# Patient Record
Sex: FEMALE | Race: WHITE | NOT HISPANIC OR LATINO | Employment: FULL TIME | URBAN - METROPOLITAN AREA
[De-identification: names, ages, dates, MRNs, and addresses within clinical notes are randomized per-mention and may not be internally consistent; named-entity substitution may affect disease eponyms.]

---

## 2018-10-03 ENCOUNTER — OFFICE VISIT CONVERTED (OUTPATIENT)
Dept: PULMONOLOGY | Facility: CLINIC | Age: 53
End: 2018-10-03
Attending: INTERNAL MEDICINE

## 2019-04-03 ENCOUNTER — HOSPITAL ENCOUNTER (OUTPATIENT)
Dept: CARDIOLOGY | Facility: HOSPITAL | Age: 54
Discharge: HOME | End: 2019-04-03
Attending: INTERNAL MEDICINE

## 2019-04-17 ENCOUNTER — OFFICE VISIT CONVERTED (OUTPATIENT)
Dept: PULMONOLOGY | Facility: CLINIC | Age: 54
End: 2019-04-17
Attending: INTERNAL MEDICINE

## 2019-04-22 ENCOUNTER — HOSPITAL ENCOUNTER (OUTPATIENT)
Dept: OTHER | Facility: HOSPITAL | Age: 54
Discharge: HOME | End: 2019-04-22
Attending: INTERNAL MEDICINE

## 2019-07-25 ENCOUNTER — HOSPITAL ENCOUNTER (OUTPATIENT)
Dept: OTHER | Facility: HOSPITAL | Age: 54
Discharge: HOME | End: 2019-07-25
Attending: INTERNAL MEDICINE

## 2019-10-22 ENCOUNTER — OFFICE VISIT CONVERTED (OUTPATIENT)
Dept: PULMONOLOGY | Facility: CLINIC | Age: 54
End: 2019-10-22
Attending: PHYSICIAN ASSISTANT

## 2019-10-30 ENCOUNTER — HOSPITAL ENCOUNTER (OUTPATIENT)
Dept: OTHER | Facility: HOSPITAL | Age: 54
Discharge: HOME | End: 2019-10-30
Attending: PHYSICIAN ASSISTANT

## 2020-03-10 ENCOUNTER — HOSPITAL ENCOUNTER (OUTPATIENT)
Dept: OTHER | Facility: HOSPITAL | Age: 55
Discharge: HOME | End: 2020-03-10
Attending: FAMILY MEDICINE

## 2020-05-28 ENCOUNTER — CONVERSION ENCOUNTER (OUTPATIENT)
Dept: GASTROENTEROLOGY | Facility: CLINIC | Age: 55
End: 2020-05-28
Attending: INTERNAL MEDICINE

## 2020-07-27 ENCOUNTER — HOSPITAL ENCOUNTER (OUTPATIENT)
Dept: PREADMISSION TESTING | Facility: HOSPITAL | Age: 55
Discharge: HOME | End: 2020-07-27
Attending: INTERNAL MEDICINE

## 2020-07-27 LAB — SARS-COV-2 RNA SPEC QL NAA+PROBE: NOT DETECTED

## 2020-07-29 ENCOUNTER — HOSPITAL ENCOUNTER (OUTPATIENT)
Dept: GASTROENTEROLOGY | Facility: HOSPITAL | Age: 55
Setting detail: HOSPITAL OUTPATIENT SURGERY
Discharge: HOME | End: 2020-07-29
Attending: INTERNAL MEDICINE

## 2020-07-29 ENCOUNTER — CONVERSION ENCOUNTER (OUTPATIENT)
Dept: GASTROENTEROLOGY | Facility: HOSPITAL | Age: 55
End: 2020-07-29

## 2021-03-15 ENCOUNTER — HOSPITAL ENCOUNTER (OUTPATIENT)
Dept: OTHER | Facility: HOSPITAL | Age: 56
Discharge: HOME | End: 2021-03-15
Attending: NURSE PRACTITIONER

## 2021-05-10 NOTE — H&P
History and Physical      Patient Name: Anna Solorio   Patient ID: [id number]   Sex: Female   YOB: 1965    Referring Provider: Pretty VASQUEZ    Visit Date: May 28, 2020    Provider: Migue Bishop MD   Location: [facility]   Location Address: [address] [place] [address]  [id number]   Location Phone: [phone]          Chief Complaint  · Surgical History and Physical  · Screening Colonoscopy      History Of Present Illness  NON-INPATIENT HISTORY AND PHYSICAL  Allergies: NO KNOWN DRUG ALLERGIES   Chief Complaint/History of Present Illness: Screening Colonoscopy, History of Colon Polyps-TA   Colon Recall: Yes How Long: 3 years   Failed Outpatient Treatment/Contraindications: N/A   Current Medications: atorvastatin 10 mg oral tablet, buspirone 10 mg oral tablet, Cymbalta 60 mg oral capsule,delayed release(DR/EC), levothyroxine 112 mcg oral tablet, metoprolol succinate 25 mg oral tablet extended release 24 hr, Nexium 40 mg oral capsule,delayed release(DR/EC), Probiotic 3 billion cell oral capsule, Rexulti 0.5 mg oral tablet, Suprep Bowel Prep Kit 17.5-3.13-1.6 gram oral recon soln, and Zyrtec 10 mg oral tablet   Significant Past Medical History: Allergic rhinitis, Anemia, Anxiety, Depression, Diverticulitis, GERD, Mood disorder, and Sleep apnea   Significant Family Medical History: No Family History of Colon Cancer   Significant Past Surgical History: Appendectomy, Breast Surgery, Colonoscopy, EGD, Gallbladder, and Lung Surgery   Previous Colonoscopy: Yes YEAR: 2014 By Whom: Migue Bishop MD   Previous EGD: Yes YEAR: 2014 By Whom: Migue Bishop MD   PHYSICAL EXAM:  Heart: Regular Rate and Rhythm   Lungs: Breathing Unlabored           Assessment  · Preoperative examination     V72.84/Z01.818  · Screening for colon cancer     V76.51/Z12.11  · History of colon polyps     V12.72/Z86.010      Plan  · Orders  o Consent for Colonoscopy Screening -Possible risk/complications, benefits,  and alternatives to surgical or invasive procedure have been explained to patient and/or legal gaurdian. -Patient has been evaluated and can tolerate anethesia and/or sedation. Risk, benefits, and alternatives to anesthesia and sedation have been explained to patient or legal gaurdian. () - V72.84/Z01.818, V76.51/Z12.11, V12.72/Z86.010 - 07/29/2020  · Medications  o Medications have been Reconciled  o Transition of Care or Provider Policy  · Instructions  o ****Surgical Orders****  o ***************  o Outpatient  o ***************  o RISK AND BENEFITS:  o Possible risks/complications, benefits and alternatives to surgical or invasive procedure have been explained to the patient and/or legal guardian.  o Patient has been evaluated and can tolerate anesthesia and/or sedation. Risks, benefits, and alternatives to anesthesia and sedation have been explained to the patient and/or legal guardian.  o ***************  o PREP: Per protocol  o IV: Per Anesthesia  o The above History and Physical Examination has been completed within 30 days of admission.  o This note has been transcribed by CAMELIA Matt MA. I have read and agree with the findings in this note.  o Electronically Identified Patient Education Materials Provided Electronically  · Disposition  o Call or Return if symptoms worsen or persist.            Electronically Signed by: Kwadwo Matt, -Author on May 28, 2020 08:48:35 AM

## 2021-05-28 VITALS
DIASTOLIC BLOOD PRESSURE: 88 MMHG | WEIGHT: 185.25 LBS | RESPIRATION RATE: 14 BRPM | BODY MASS INDEX: 34.09 KG/M2 | BODY MASS INDEX: 36.84 KG/M2 | OXYGEN SATURATION: 99 % | HEIGHT: 62 IN | OXYGEN SATURATION: 97 % | HEART RATE: 124 BPM | TEMPERATURE: 98.1 F | TEMPERATURE: 97.5 F | DIASTOLIC BLOOD PRESSURE: 90 MMHG | HEIGHT: 61 IN | SYSTOLIC BLOOD PRESSURE: 150 MMHG | SYSTOLIC BLOOD PRESSURE: 140 MMHG | RESPIRATION RATE: 16 BRPM | WEIGHT: 195.12 LBS | HEART RATE: 109 BPM

## 2021-05-28 VITALS
HEART RATE: 103 BPM | HEIGHT: 61 IN | TEMPERATURE: 98.3 F | WEIGHT: 182.12 LBS | SYSTOLIC BLOOD PRESSURE: 137 MMHG | BODY MASS INDEX: 34.39 KG/M2 | DIASTOLIC BLOOD PRESSURE: 99 MMHG | RESPIRATION RATE: 12 BRPM | OXYGEN SATURATION: 94 %

## 2021-05-28 NOTE — PROGRESS NOTES
Patient: TAMAR WALLIS     Acct: [id number]     Report: #[id number]  UNIT #: [id number]     : 1965    Encounter Date:10/22/2019  PRIMARY CARE: LENNY CARREON  ***Signed***  --------------------------------------------------------------------------------------------------------------------  Chief Complaint      Encounter Date      Oct 22, 2019            Primary Care Provider      LENNY CARREON            Referring Provider      LENNY CARREON            Patient Complaint      Patient is complaining of      Pt here for left lower lobe pneumonia/COPD            VITALS      Height 5 ft 1 in / 154.94 cm      Weight 182 lbs 2 oz / 82.222378 kg      BSA 1.82 m2      BMI 34.4 kg/m2      Temperature 98.3 F / 36.83 C - Oral      Pulse 103      Respirations 12      Blood Pressure 137/99 Sitting, Right Arm      Pulse Oximetry 94%, room air      Initial Exhaled Nitrous Oxide      Exhaled Nitrous Oxide Results:  8            HPI      The patient is a pleasant 54 year old white female patient of Dr. Lopes's last     seen by her in the office in 2019.  She has had a history of mild COPD,     emphysema, history of benign lung mass that was resected in 2015.  She is here     today because she recently had a pneumonia that was treated by her PCP.  She     tells me that she had a chest x-ray at her PCPs office in [place] done about    one week ago that showed a left lower lobe pneumonia.  She was treated with ten     days of Augmentin. She still has four days of this left.  She was treated with a    tapered course of prednisone and has one dose of that left. She is improving,     had been having some increased dyspnea, coughing and wheezing and feels like     that is almost fully resolved now. She now feels like she is about back to her     baseline.  She denies any hemoptysis, fever or chills. She is using Trelegy     ellipta one puff once a day and feels like it helps her. She has been using     DuoNebs twice a day for the  past one week while she was being treated for     pneumonia and is asking if she can back off on those now that she is feeling     better.            I have reviewed her ROS, medical, surgical and family history and agree with     those as entered in the chart.      Copies To:   SADIE REDD ;            FERNANDO      Constitutional:  Denies: Fatigue, Fever, Weight gain, Weight loss, Chills,     Insomnia, Other      Respiratory/Breathing:  Complains of: Shortness of air, Wheezing, Cough; Denies:    Hemoptysis, Pleuritic pain, Other      Endocrine:  Denies: Polydipsia, Polyuria, Heat/cold intolerance, Abnorml     menstrual pattern, Diabetes, Other      Eyes:  Denies: Blurred vision, Vision Changes, Other      Ears, nose, mouth, throat:  Complains of: Nasal Discharge, Throat pain; Denies:     Congestion, Dysphagia, Hearing Changes, Nose Bleeding, Tinnitus, Other      Cardiovascular:  Denies: Chest Pain, Exertional dyspnea, Peripheral Edema,     Palpitations, Syncope, Wake up Gasping for air, Orthopnea, Tachycardia, Other      Gastrointestinal:  Complains of: Diarrhea (pt thinks due to antibiotic she is     taking); Denies: Abdominal pain/cramping, Bloody stools, Constipation, Melena,     Nausea, Vomiting, Other      Genitourinary:  Complains of: Urinary frequency; Denies: Dysuria, Incontinence,     Hematuria, Urgency, Other      Musculoskeletal:  Complains of: Joint Pain, Joint Stiffness, Joint Swelling;     Denies: Myalgias, Other      Hematologic/lymphatic:  COMPLAINS OF: Bruising; DENIES: Lymphadenopathy,     Bleeding tendencies, Other      Neurologic:  Denies: Headache, Numbness, Weakness, Seizures, Other      Psychiatric:  Complains of: Anxiety (due to steroids), Depression; Denies:     Appropriate Effect, Other      Sleep:  No: Excessive daytime sleep, Morning Headache?, Snoring, Insomnia?, Stop    breathing at sleep?, Other      Integumentary:  Complains of: Dry skin; Denies: Rash, Skin Warm to Touch, Other             FAMILY/SOCIAL/MEDICAL HX      Surgical History:  Yes: Cholecystectomy (), Other Surgeries ( resection     of left upper lobe lung mass); No: Abdominal Surgery, Appendectomy, Bladder     Surgery, Bowel Surgery, CABG, Head Surgery, Oral Surgery, Orthopedic Surgery,     Vascular Surgery      Diabetes - Family Hx:  Sister      Cancer/Type - Family Hx:  Mother (leukemia, polycythemia), Father (leukemia     related to agent orange)      Other Family Medical History:  Mother (emphysema/copd)      Is Father Still Living?:  No      Is Mother Still Living?:  No      Social History:  Tobacco Use; No Alcohol Use, No Recreational Drug use      Smoking status:  Former smoker (2 ppd x 20 year, quit 2015)      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      Whom do you live with?:  3 dogs, 1 cat       Section:  Yes      Tubal Ligation:  No      Hysterectomy:  No      Anticoagulation Therapy:  No      Antibiotic Prophylaxis:  No      Medical History:  Yes: Asthma, Chemotherapy/Cancer (LEFT LUNG MASS -benign),     Chronic Bronchitis/COPD, Depression, Anxiety, Shortness Of Breath (LUNG MASS     LEFT LUNG, ), Thyroid Problem; No: Arthritis, Blood Disease, Congestive Heart     Failu, Deafness or Ringing Ears, Diabetes, Seizures, Heart Attack,     Hemorrhoids/Rectal Prob, High Blood Pressure, Sinus Trouble, Miscellaneous     Medical/oth      Psychiatric History      Depression/Anxiety            PREVENTION      Hx Influenza Vaccination:  No      Date Influenza Vaccine Given:  Sep 25, 2019      Influenza Vaccine Declined:  No      2 or More Falls Past Year?:  No      Fall Past Year with Injury?:  No      Hx Pneumococcal Vaccination:  Yes      Encouraged to follow-up with:  PCP regarding preventative exams.      Chart initiated by      Katie Ayoub MA            ALLERGIES/MEDICATIONS      Allergies:        Coded Allergies:             NO KNOWN DRUG ALLERGIES (Verified  Allergy, Unknown, 10/22/19)      Medications     Last Reconciled on 10/22/19 15:38 by SURESH BLANK      Albuterol Sulfate (Albuterol Sulfate) 1.25 Mg/3 Ml Vial.neb      1.25 MG INH Q4H PRN for SHORTNESS OF BREATH, #120 NEB 0 Refills         Reported         10/22/19       Amoxicillin/Clavulanic Acid 875/125 (Augmentin 875/125) 1 Each Tablet      875 MG PO BID, TAB 0 Refills         Reported         10/22/19       Metoprolol Succinate (Metoprolol Succinate) 25 Mg Tab.er.24h      25 MG PO QDAY, #30 TAB 0 Refills         Reported         10/22/19       Atorvastatin (Atorvastatin) 10 Mg Tablet      10 MG PO HS, #30 TAB         Reported         10/22/19       Levocetirizine Dihydrochloride (Xyzal) 5 Mg Tablet      5 MG PO QDAY, TAB         Reported         10/22/19       Levothyroxine (Levothyroxine) 0.112 Mg Tablet      0.112 MG PO QDAY@07, #30 TAB 0 Refills         Reported         10/22/19       Fluticasone/Umeclidin/Vilanter (Trelegy Ellipta 100-62.5-25) 1 Each Blst.w.dev      1 PUFF INH RTQDAY, #1 INH 3 Refills         Prov: SADIE REDD         8/26/19       MDI-Albuterol (Ventolin HFA) 8 Gm Hfa.aer.ad      2 PUFFS INH Q4-6H PRN for SHORTNESS OF BREATH, #1 INH 6 Refills         Reported         10/3/18       DULoxetine (Cymbalta) 30 Mg Capsule.dr      90 MG PO QDAY, #90 CAP 0 Refills         Reported         12/14/17       Esomeprazole Mag (NexIUM) 40 Mg Capsule      40 MG PO HS, CAP         Reported         2/7/13      Current Medications      Current Medications Reviewed 10/22/19            EXAM      VITAL SIGNS:  Reviewed.        GENERAL:       NECK:  Supple without tracheal deviation or lymphadenopathy.  No thyromegaly     appreciated.      LYMPHATICS:  No cervical or supraclavicular lymphadenopathy.      HEENT: Pupils are equal, round and reactive to light. There is no scleral     icterus.  Nares patent without hypertrophy of the turbinates. No erythema of the    passages.  TMs are clear bilaterally with good cone of light. The posterior     pharynx  is without  lesions or erythema.      RESPIRATORY:  Lungs are grossly clear to auscultation.  No wheezes, rhonchi or     crackles appreciated.  Normal work of breathing.        CARDIOVASCULAR:  Regular rate and rhythm.  No murmurs, gallops or rubs.  No     lower extremity edema.  Equal radial pulses.        GI: Soft, nontender, nondistended, no organomegaly.  Bowel sounds present in all    four quadrants.      MUSCULOSKELETAL:  No joint effusions, erythema or warmth over the major joint     systems.      SKIN:  No rashes or lesions.      NEUROLOGIC: Cranial nerves II-XII are intact bilaterally.  Moves all     extremities. Ambulates with ease.      PSYCH:  Appropriate mood and affect.      Vitals      Vitals:             Height 5 ft 1 in / 154.94 cm           Weight 182 lbs 2 oz / 82.529882 kg           BSA 1.82 m2           BMI 34.4 kg/m2           Temperature 98.3 F / 36.83 C - Oral           Pulse 103           Respirations 12           Blood Pressure 137/99 Sitting, Right Arm           Pulse Oximetry 94%, room air            REVIEW      Results Reviewed      PCCS Results Reviewed?:  Yes Prev Lab Results, Yes Prev Radiology Results, Yes     Previous [facility] Records      Lab Results      I personally reviewed previous lab work, imaging and provider notes.            Assessment      CAP (community acquired pneumonia) - J18.9            Notes      New Medications      * Levothyroxine 0.112 MG TABLET: 0.112 MG PO QDAY@07 #30         Replaced Levothyroxine (Synthroid) 100 MCG TABLET:         0.1 MG PO QDAY@07 #30      * Levocetirizine Dihydrochloride (Xyzal) 5 MG TABLET: 5 MG PO QDAY      * Atorvastatin 10 MG TABLET: 10 MG PO HS #30      * Metoprolol Succinate 25 MG TAB.ER.24H: 25 MG PO QDAY #30      * Amoxicillin/Clavulanic Acid 875/125 (Augmentin 875/125) 1 EACH TABLET: 875 MG       PO BID      * Albuterol Sulfate 1.25 MG/3 ML VIAL.NEB: 1.25 MG INH Q4H PRN SHORTNESS OF       BREATH #120         Instructions: DIAGNOSIS  CODE REQUIRED PRIOR TO PRESCRIBING.      New Diagnostics      * Chest 2 View, Week         Dx: CAP (community acquired pneumonia) - J18.9      ASSESSMENT:       1.  Mild COPD without acute exacerbation.      2.  Recent community acquired pneumonia from an unspecified organism.      3.  Emphysema.      4.  History of benign lung mass, status post resection.      5. Former heavy tobacco use now in remission since 2015.        6.  Anemia.      6. Right upper lobe ground glass opacity seen on chest CT in 07/2019, already     scheduled for six month follow up CT.              PLAN:      1.  I have discussed with patient regarding recent pneumonia. I do not have any     records or imaging reports from her PCP, but I will request a report from chest     x-ray that was done one week ago. She has four days left of a ten day course of     Augmentin, is finishing up a tapered course of prednisone and is clinically     improving.  I would have her complete that course of antibiotics and steroids     and will repeat a chest x-ray for her in one week to ensure resolution of recent    pneumonia. I did also discuss with her regarding chest CT results from 07/2019.     She had been called with those results as well as previously seen left lower     lobe nodule had resolved, but there was some new right upper lobe ground glass     opacities. She is already scheduled for six month follow up chest CT and will     have that done in about three months.      2. I will have her continue on Trelegy ellipta inhaler with one puff once daily     and albuterol as needed. I have discussed with her that I am fine with her using    DuoNebs just as needed for shortness of breath, coughing or wheezing rather than    as scheduled as it seems that her recent pneumonia is resolving.      3. I have congratulated her on remaining off of cigarettes.  She quit smoking in    2015.      4. I will have her follow up in 3-4 months with Dr. Lopes after chest  CT is     done and she is to call us sooner if needed.            Patient Education      Patient Education Provided:  COPD, How to use an Inhaler      Time Spent:  > 50% /Coord Care            Electronically signed by ALEK ABARCA PA-C  10/24/2019 16:08       Disclaimer: Converted document may not contain table formatting or lab diagrams. Please see [employer] for the authenticated document.

## 2021-05-28 NOTE — PROGRESS NOTES
Patient: TAMAR WALLIS     Acct: [id number]     Report: #[id number]  UNIT #: [id number]     : 1965    Encounter Date:10/03/2018  PRIMARY CARE: LENNY CARREON  ***Signed***  --------------------------------------------------------------------------------------------------------------------  Chief Complaint      Encounter Date      Oct 3, 2018            Primary Care Provider      LENNY CARREON            Referring Provider      LENNY CARREON            Patient Complaint      Patient is complaining of      New pt here for COPD            VITALS      Height 5 ft 2 in / 157.48 cm      Weight 185 lbs 4 oz / 84.125921 kg      BSA 1.85 m2      BMI 33.9 kg/m2      Temperature 98.1 F / 36.72 C - Temporal      Pulse 124      Respirations 16      Blood Pressure 150/88 Sitting, Left Arm      Pulse Oximetry 99%, Room air      Exhaled Nitrous Oxide Testin            HPI      The patient is a 53 year old former tobacco user with a 40 pack year smoking     history.  She quit in  and presents for establishing care for COPD.  She was    a patient of Dr. Christensen's until she decided to seek a new provider. She has     been treated for pneumonia with two rounds of antibiotics within the last month,    although she says her chest x-ray was clear. She has a history of a left lung     benign mass that was resected. She has failed Symbicort before and is currently     on Trelegy ellipta inhaler and thinks that it works quite well.  She does not     have to use her rescue inhaler often.  She denies shortness of breath, coughing     and wheezing.              Review of systems as noted.            Past medical, family, social and surgical history updated in the chart,     unchanged since last visit.              Medications were reviewed and updated in the chart.            ROS      Constitutional:  Complains of: Other (allergies); Denies: Fatigue, Fever, Weight    gain, Weight loss, Chills, Insomnia      Respiratory/Breathing:   Denies: Shortness of air, Wheezing, Cough, Hemoptysis,     Pleuritic pain, Other      Endocrine:  Denies: Polydipsia, Polyuria, Heat/cold intolerance, Abnorml     menstrual pattern, Diabetes, Other      Eyes:  Denies: Blurred vision, Vision Changes, Other      Ears, nose, mouth, throat:  Denies: Congestion, Dysphagia, Hearing Changes, Nose    Bleeding, Nasal Discharge, Throat pain, Tinnitus, Other      Cardiovascular:  Denies: Chest Pain, Exertional dyspnea, Peripheral Edema,     Palpitations, Syncope, Wake up Gasping for air, Orthopnea, Tachycardia, Other      Gastrointestinal:  Denies: Abdominal pain/cramping, Bloody stools, Constipation,    Diarrhea, Melena, Nausea, Vomiting, Other      Genitourinary:  Denies: Dysuria, Urinary frequency, Incontinence, Hematuria,     Urgency, Other      Musculoskeletal:  Denies: Joint Pain, Joint Stiffness, Joint Swelling, Myalgias,    Other      Hematologic/lymphatic:  DENIES: Lymphadenopathy, Bruising, Bleeding tendencies,     Other      Neurologic:  Denies: Headache, Numbness, Weakness, Seizures, Other      Psychiatric:  Denies: Anxiety, Appropriate Effect, Depression, Other      Sleep:  No: Excessive daytime sleep, Morning Headache?, Snoring, Insomnia?, Stop    breathing at sleep?, Other      Integumentary:  Denies: Rash, Dry skin, Skin Warm to Touch, Other            FAMILY/SOCIAL/MEDICAL HX      Surgical History:  Yes: Cholecystectomy (2012), Other Surgeries (2015 resection     of left upper lobe lung mass); No: Abdominal Surgery, Appendectomy, Bladder     Surgery, Bowel Surgery, CABG, Head Surgery, Oral Surgery, Orthopedic Surgery,     Vascular Surgery      Diabetes - Family Hx:  Sister      Cancer/Type - Family Hx:  Mother (leukemia, polycythemia), Father (leukemia     related to agent orange)      Other Family Medical History:  Mother (emphysema/copd)      Is Father Still Living?:  No      Is Mother Still Living?:  No      Smoking status:  Former smoker (2 ppd x 20 year,  quit 6/2015)      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      Whom do you live with?:  3 dogs, 1 cat      Anticoagulation Therapy:  No      Antibiotic Prophylaxis:  No      Medical History:  Yes: Asthma, Chemotherapy/Cancer (LEFT LUNG MASS -benign),     Chronic Bronchitis/COPD, Depression, Shortness Of Breath (LUNG MASS LEFT LUNG,     ), Thyroid Problem; No: Arthritis, Blood Disease, Congestive Heart Failu,     Deafness or Ringing Ears, Diabetes, Seizures, Heart Attack, Hemorrhoids/Rectal     Prob, High Blood Pressure, Miscellaneous Medical/oth      Psychiatric History      Depression            PREVENTION      Hx Influenza Vaccination:  Yes      Date Influenza Vaccine Given:  Oct 1, 2017      Influenza Vaccine Declined:  No      2 or More Falls Past Year?:  No      Fall Past Year with Injury?:  No      Hx Pneumococcal Vaccination:  Yes      Encouraged to follow-up with:  PCP regarding preventative exams.      Chart initiated by      Agatha Wilson MA            ALLERGIES/MEDICATIONS      Allergies:        Coded Allergies:             NO KNOWN DRUG ALLERGIES (Unverified  Allergy, Unknown, 10/3/18)      Medications    Last Reconciled on 10/3/18 15:16 by SADIE REDD,       Brompheneiramine/PE/DM 1-2.5-5 MG/5ML (Dimaphen DM) 118 Ml Liquid      10 ML PO Q4H PRN for COUGH, ML         Reported         10/3/18       Iron,Carbonyl/Ascorbic Acid (Iron 100-Vitamin C) 1 Each Tablet      1 EACH PO, TAB         Reported         10/3/18       Potassium Gluconate (Potassium Gluconate) 99 Mg Tab      99 MG PO Q2D, #30 TAB         Reported         10/3/18       Cholecalciferol (Vitamin D3) 50,000 Unit Capsule      92726 UNITS PO Q7D, CAP         Reported         10/3/18       Pyridoxine Hcl (Vitamin B-6*) 50 Mg Tablet      100 MG PO QDAY, TAB         Reported         10/3/18       Fluticasone/Umeclidin/Vilanter (Trelegy Ellipta 100-62.5-25) 1 Each Blst.w.dev      1 PUFF INH RTQDAY, #1 INH         Reported         10/3/18        Fexofenadine HCl (Allegra Allergy) 180 Mg Tablet      180 MG PO QDAY, #30 TAB 0 Refills         Reported         10/3/18       Levothyroxine (Synthroid) 100 Mcg Tablet      0.1 MG PO QDAY@07, #30 TAB 0 Refills         Reported         12/14/17       DULoxetine (Cymbalta) 30 Mg Capsule.dr      90 MG PO QDAY, #90 CAP 0 Refills         Reported         12/14/17       diphenhydrAMINE HCl (Benadryl*) 25 Mg Tablet      50 MG PO HS, #100 TAB         Reported         12/11/14       Esomeprazole Mag (NexIUM) 40 Mg Capsule      40 MG PO HS, CAP         Reported         2/7/13      Current Medications      Current Medications Reviewed 10/3/18            EXAM      VITAL SIGNS:  Reviewed.  She was tachycardic at 124 beats per minute.        NECK:  Supple without tracheal deviation or lymphadenopathy.  No thyromegaly     appreciated.      LYMPHATICS:  No cervical or supraclavicular lymphadenopathy.      HEENT:  The patient had a facial droop on the right.        RESPIRATORY:  Clear to auscultation bilaterally.  No wheezes, rales or rhonchi.     Tympanic to percussion.        CARDIOVASCULAR:  Tachycardia, but regular rhythm without murmurs, rubs or     gallops.        GI: Soft, nontender, nondistended, no organomegaly.  Bowel sounds present in all    four quadrants.      MUSCULOSKELETAL:  No joint effusions, erythema or warmth over the major joint     systems.      SKIN:  No rashes or lesions.      NEUROLOGIC: Cranial nerves II-XII are intact bilaterally.  Moves all     extremities. Ambulates with ease.      PSYCH:  Appropriate mood and affect.      Vitals      Vitals:             Height 5 ft 2 in / 157.48 cm           Weight 185 lbs 4 oz / 84.610598 kg           BSA 1.85 m2           BMI 33.9 kg/m2           Temperature 98.1 F / 36.72 C - Temporal           Pulse 124           Respirations 16           Blood Pressure 150/88 Sitting, Left Arm           Pulse Oximetry 99%, Room air            REVIEW      Results Reviewed       [facility]  Yes Prev Lab Results, Yes Prev Radiology Results, Yes     Previous Mecial Records      Radiographic Results               [facility]                PACS RADIOLOGY REPORT            Patient: TAMAR WALLIS   Acct: #[id number]   Report: #[id number]            UNIT #: [id number]    DOS: 18 1341      INSURANCE:[facility]   ORDER #:[id number]      LOCATION:[facility]     : 1965            PROVIDERS      ADMITTING:     ATTENDING: Wendie Christensen      FAMILY:  NONE,MD   ORDERING:  Wendie Christensen         OTHER:    DICTATING:  Rangel Browning MD            REQ #:[id number]   EXAM:W - CT CHEST with CONTRAST      REASON FOR EXAM:        REASON FOR VISIT:  LUNG NODULE/COPD            *******Signed******         PROCEDURE:   CT CHEST W/ CONTRAST             COMPARISON:   [facility], PET, SKULL BASE TO MID THIGH INITIAL,     2018, 8:52.        [facility], CT, CHEST W/ CONTRAST, 2017, 20:34.             INDICATIONS:   COPD; LUNG NODULE             TECHNIQUE:   After obtaining the patient's consent, CT images were obtained with    non-ionic       intravenous contrast material.               PROTOCOL:     Standard imaging protocol performed                RADIATION:     DLP: 605mGy*cm          Automated exposure control was utilized to minimize radiation dose.       CONTRAST:   95cc Isovue 370 I.V.             FINDINGS:         There is upper lobe emphysema.  There is scarring and what appears to be perhaps    some surgical       sutures involving the lateral aspect of the left upper lobe.  On the previous     study from 2017       there was noted a large area of consolidation pleural-based laterally in the lef    t upper lobe which       has completely resolved on today's study.  There is mild scarring in the     lingular segment of the       left upper lobe.  There are no  suspicious pulmonary parenchymal mass is.  There     are several small       left hilar and mediastinal lymph nodes which are unchanged in size.  There is     again noted a large       8.6 cm hiatal hernia.  There is fatty infiltration of the liver.  Surgical clips    suggest prior       cholecystectomy.  Again is noted a small 1.6 cm right adrenal adenoma.             CONCLUSION:                1.  Scarring and postoperative changes in the left upper lobe.  This is     superimposed on       predominantly upper lobe emphysema.  Specifically given the patient's history,     there are no       suspicious pulmonary nodules.             2.  Minimal left hilar and mediastinal adenopathy which is stable.             3.  Fatty infiltration of the liver.              MIKI VILLARREAL MD             Electronically Signed and Approved By: MIKI VILLARREAL MD on 4/24/2018 at 14:56                           Until signed, this is an unconfirmed preliminary report that may contain      errors and is subject to change.                                              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            Tachycardia - R00.0            Notes      New Medications      * Fexofenadine HCl (Allegra Allergy) 180 MG TABLET: 180 MG PO QDAY #30      * Fluticasone/Umeclidin/Vilanter (Trelegy Ellipta 100-62.5-25) 1 EACH       BLST.W.DEV: 1 PUFF INH RTQDAY #1      * Pyridoxine Hcl (Vitamin B-6*) 50 MG TABLET: 100 MG PO QDAY      * Cholecalciferol (Vitamin D3) 50,000 UNIT CAPSULE: 50,000 UNITS PO Q7D      * Potassium Gluconate 99 MG TAB: 99 MG PO Q2D #30      * IRON,CARBONYL/ASCORBIC ACID (Iron 100-Vitamin C) 1 EACH TABLET: 1 EACH PO      * Brompheneiramine/PE/DM 1-2.5-5 MG/5ML (Dimaphen DM) 118 ML LIQUID: 10 ML PO       Q4H PRN COUGH      * Fluticasone/Umeclidin/Vilanter (Trelegy Ellipta 100-62.5-25) 1 EACH        BLST.W.DEV: 1 PUFF INH RTQDAY #1      * MDI-Albuterol (Ventolin HFA*) 8 GM HFA.AER.AD: 2 PUFFS INH Q4-6H PRN SHORTNESS      OF BREATH #1      Changed Medications      * diphenhydrAMINE HCl (Benadryl*) 25 MG TABLET: 50 MG PO HS #100         Instructions: 1 TAB      Discontinued Medications      * Ondansetron HCl (Zofran*) 4 MG TABLET: 4 MG PO Q6H PRN NAUSEA AND/OR VOMITING       #4      * Guaifenesin (Humibid La*) 600 MG TAB.ER.12H: 1,200 MG PO BID #20      * Levofloxacin (Levaquin*) 750 MG TABLET: 750 MG PO QDAY 10 Days #10      New Diagnostics      * Low Dose Chest CT, 6 Months         Dx: Former smoker - Z87.891      * 6 Min Walk With Pulse Ox, Routine         Dx: 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  COPD, unspecified severity.      2.  Emphysema.      3.  Former smoker.      4.  History of recurrent pneumonia.      5.  History of benign lung mass, status post resection.      6. Tachycardia.            PLAN:      1.  Obtain EKG.      2.  Obtain full PFTs and six minute walk test.      3.  She will be due for a low dose CT scan of the chest in 04/2019. This is six     months from today and I have went ahead and ordered that.      4. Continue Trelegy, I gave her more samples today.      5. I commended her on her smoking cessation.      6. She will get her flu shot from her PCP.  She thinks she has had her pneumonia    vaccine.            Patient Education      Patient Education Provided:  COPD      Time Spent:  > 50% /[facility]                 Disclaimer: Converted document may not contain table formatting or lab diagrams. Please see [employer] for the authenticated document.

## 2021-05-28 NOTE — PROGRESS NOTES
Patient: TAMAR WALLIS     Acct: [id number]     Report: #[id number]  UNIT #: [id number]     : 1965    Encounter Date:2019  PRIMARY CARE: LENNY CARREON  ***Signed***  --------------------------------------------------------------------------------------------------------------------  Chief Complaint      Encounter Date      2019            Primary Care Provider      LENNY CARREON            Referring Provider      LENNY CARREON            Patient Complaint      Patient is complaining of      Pt here for 6mo F/U and PFT results            VITALS      Height 5 ft 1 in / 154.94 cm      Weight 195 lbs 2 oz / 88.034308 kg      BSA 1.87 m2      BMI 36.9 kg/m2      Temperature 97.5 F / 36.39 C - Temporal      Pulse 109      Respirations 14      Blood Pressure 140/90 Sitting, Right Arm      Pulse Oximetry 97%, room air      Initial Exhaled Nitrous Oxide      Exhaled Nitrous Oxide Results:  8            HPI      The patient is a 53 year old former tobacco user with a history of COPD and     emphysema.  She is here today for follow up regarding abnormal chest CT.  I had     ordered a repeat CT scan prior to today's visit, but unfortunately it was     rejected because she was not in the lung cancer screening program.  She returned    today stating that she is more wheezy secondary to allergies.  She is taking a     Claritin in the morning, Singulair at night and on the weekends she takes     Benadryl in the evenings.  She is on Trelegy ellipta and takes this everyday and    thinks it is working fairly well except for recently when the blooming season     happened.  She has an albuterol rescue inhaler and uses it approximately once     per day.  She has sleep apnea that is untreated. She does not wear a CPAP     machine secondary to not being able to tolerate the masks.  She has an     appointment with gastroenterology for workup of anemia and is due to undergo an     EGD and colonoscope. This patient has a  history of abnormal PET scan in 01/2018     which showed a focal hypermetabolic activity in the distal esophagus which was     suppose to be correlated with upper endoscopy, but she has not had this done     yet.              Review of systems as noted in the chart.              Past medical, family, social and surgical history reviewed and I agree with that    as documented in the medical chart.  She has a history of benign left lung mass,    status post resection.              Medications were reviewed and updated in the chart.            ROS      Constitutional:  Denies: Fatigue, Fever, Weight gain, Weight loss, Chills,     Insomnia, Other      Respiratory/Breathing:  Complains of: Shortness of air, Wheezing, Cough; Denies:    Hemoptysis, Pleuritic pain, Other      Endocrine:  Denies: Polydipsia, Polyuria, Heat/cold intolerance, Abnorml     menstrual pattern, Diabetes, Other      Eyes:  Denies: Blurred vision, Vision Changes, Other      Ears, nose, mouth, throat:  Denies: Mouth lesions, Thrush, Throat pain,     Hoarseness, Allergies/Hay Fever, Post Nasal Drip, Headaches, Recent Head Injury,    Nose Bleeding, Neck Stiffness, Thyroid Mass, Hearing Loss, Ear Fullness, Dry     Mouth, Nasal or Sinus Pain, Dry Lips, Nasal discharge, Nasal congestion, Other      Cardiovascular:  Denies: Palpitations, Syncope, Claudication, Chest Pain, Wake     up Gasping for air, Leg Swelling, Irregular Heart Rate, Cyanosis, Dyspnea on     Exertion, Other      Gastrointestinal:  Complains of: Reflux/Heartburn; Denies: Nausea, Constipation,    Diarrhea, Abdominal pain, Vomiting, Difficulty Swallowing, Dysphagia, Jaundice,     Bloating, Melena, Bloody stools, Other      Genitourinary:  Denies: Urinary frequency, Incontinence, Hematuria, Urgency,     Nocturia, Dysuria, Testicular problems, Other      Musculoskeletal:  Denies: Joint Pain, Joint Stiffness, Joint Swelling, Myalgias,    Other      Hematologic/lymphatic:  DENIES:  Lymphadenopathy, Bruising, Bleeding tendencies,     Other      Neurological:  Denies: Headache, Numbness, Weakness, Seizures, Other      Psychiatric:  Denies: Anxiety, Appropriate Effect, Depression, Other      Sleep:  Yes: Excessive daytime sleep; No: Morning Headache?, Snoring, Insomnia?,    Stop breathing at sleep?, Other      Integumentary:  Denies: Rash, Dry skin, Skin Warm to Touch, Other      Immunologic/Allergic:  Denies: Latex allergy, Seasonal allergies, Asthma,     Urticaria, Eczema, Other      Immunization status:  No: Up to date            FAMILY/SOCIAL/MEDICAL HX      Surgical History:  Yes: Cholecystectomy (2012), Other Surgeries (2015 resection     of left upper lobe lung mass); No: Abdominal Surgery, Appendectomy, Bladder     Surgery, Bowel Surgery, CABG, Head Surgery, Oral Surgery, Orthopedic Surgery,     Vascular Surgery      Diabetes - Family Hx:  Sister      Cancer/Type - Family Hx:  Mother (leukemia, polycythemia), Father (leukemia     related to agent orange)      Other Family Medical History:  Mother (emphysema/copd)      Is Father Still Living?:  No      Is Mother Still Living?:  No      Smoking status:  Former smoker (2 ppd x 20 year, quit 6/2015)      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      Whom do you live with?:  3 dogs, 1 cat      Anticoagulation Therapy:  No      Antibiotic Prophylaxis:  No      Medical History:  Yes: Asthma, Chemotherapy/Cancer (LEFT LUNG MASS -benign),     Chronic Bronchitis/COPD, Depression, Shortness Of Breath (LUNG MASS LEFT LUNG,     ), Thyroid Problem; No: Arthritis, Blood Disease, Congestive Heart Failu,     Deafness or Ringing Ears, Diabetes, Seizures, Heart Attack, Hemorrhoids/Rectal     Prob, High Blood Pressure, Miscellaneous Medical/oth      Psychiatric History      Depression            PREVENTION      Hx Influenza Vaccination:  Yes      Date Influenza Vaccine Given:  Oct 1, 2017      Influenza Vaccine Declined:  No      2 or More Falls Past  Year?:  No      Fall Past Year with Injury?:  No      Hx Pneumococcal Vaccination:  Yes      Encouraged to follow-up with:  PCP regarding preventative exams.      Chart initiated by      Aliya Masters MA            ALLERGIES/MEDICATIONS      Allergies:        Coded Allergies:             NO KNOWN DRUG ALLERGIES (Verified  Allergy, Unknown, 4/17/19)      Medications    Last Reconciled on 4/17/19 15:13 by SADIE REDD, DO      Magnesium Amino Acid Chelate (Magnesium*) 100 Mg Tablet      250 MG PO QDAY, TAB         Reported         4/17/19       Ascorbic Acid (Vitamin C*) 500 Mg Tablet      500 MG PO QDAY, #60 TAB 0 Refills         Reported         4/17/19       Montelukast Sodium (Montelukast*) 10 Mg Tablet      10 MG PO QDAY, TAB         Reported         4/17/19       Loratadine (Claritin) 10 Mg Tablet      10 MG PO QDAY, #30 TAB 0 Refills         Reported         4/17/19       MDI-Albuterol (Ventolin HFA) 8 Gm Hfa.aer.ad      2 PUFFS INH Q4-6H PRN for SHORTNESS OF BREATH, #1 INH 6 Refills         Reported         10/3/18       Fluticasone/Umeclidin/Vilanter (Trelegy Ellipta 100-62.5-25) 1 Each Blst.w.dev      1 PUFF INH RTQDAY, #1 INH 3 Refills         Prov: SADIE REDD         10/3/18       Iron,Carbonyl/Ascorbic Acid (Iron 100-Vitamin C) 1 Each Tablet      1 EACH PO, TAB         Reported         10/3/18       Fluticasone/Umeclidin/Vilanter (Trelegy Ellipta 100-62.5-25) 1 Each Blst.w.dev      1 PUFF INH RTQDAY, #1 INH         Reported         10/3/18       Levothyroxine (Synthroid) 100 Mcg Tablet      0.1 MG PO QDAY@07, #30 TAB 0 Refills         Reported         12/14/17       DULoxetine (Cymbalta) 30 Mg Capsule.dr      90 MG PO QDAY, #90 CAP 0 Refills         Reported         12/14/17       Esomeprazole Mag (NexIUM) 40 Mg Capsule      40 MG PO HS, CAP         Reported         2/7/13      Current Medications      Current Medications Reviewed 4/17/19            EXAM      VITAL SIGNS:  Reviewed.        NECK:   Supple without tracheal deviation or lymphadenopathy.  No thyromegaly     appreciated.      LYMPHATICS:  No cervical or supraclavicular lymphadenopathy.      HEENT: Pupils are equal, round and reactive to light. There is no scleral     icterus.  Nares patent without hypertrophy of the turbinates. No erythema of the    passages.  TMs are clear bilaterally with good cone of light. The posterior     pharynx is without  lesions or erythema.      RESPIRATORY:  Clear to auscultation bilaterally.  No wheezes, rales or rhonchi.     Tympanic to percussion.        CARDIOVASCULAR:  Regular rate and rhythm.  No murmurs, gallops or rubs.  No low    er extremity edema.  Equal radial pulses.        GI: Soft, nontender, nondistended, no organomegaly.  Bowel sounds present in all    four quadrants.      MUSCULOSKELETAL:  No joint effusions, erythema or warmth over the major joint     systems.      SKIN:  No rashes or lesions.      NEUROLOGIC: Cranial nerves II-XII are intact bilaterally.  Moves all     extremities. Ambulates with ease.      PSYCH:  Appropriate mood and affect.      Vtials      Vitals:             Height 5 ft 1 in / 154.94 cm           Weight 195 lbs 2 oz / 88.920008 kg           BSA 1.87 m2           BMI 36.9 kg/m2           Temperature 97.5 F / 36.39 C - Temporal           Pulse 109           Respirations 14           Blood Pressure 140/90 Sitting, Right Arm           Pulse Oximetry 97%, room air            REVIEW      Results Reviewed      PCCS Results Reviewed?:  Yes Prev Lab Results, Yes Prev Radiology Results, Yes     [facility]      Lab Results      Reviewed pulmonary function test and six minute walk test.      Radiographic Results               [facility]                PACS RADIOLOGY REPORT            Patient: TAMAR WALLIS   Acct: #[id number]   Report: #[id number]            UNIT #: [id number]    DOS: 01/08/18 0822      INSURANCE:[employer] - [employer]   ORDER #:[id number]      LOCATION:PET     : 1965            PROVIDERS      ADMITTING:     ATTENDING: Wendie Christensen      FAMILY:  JACKI CAMPBELL   ORDERING:  Wendie Christensen         OTHER:    DICTATING:  Bennie Dong MD, IV            REQ #:[id number]   EXAM:[id number] - SKULL BASE TO MIDTHIGH INITIAL      REASON FOR EXAM:  R91.1      REASON FOR VISIT:  R91.1            *******Signed******         PROCEDURE:   PET CT SKULL BASE TO MID THIGH INITIAL             COMPARISON:   [facility], CT, CHEST W/ CONTRAST, 2017,     20:34.             INDICATIONS:   R91.1             TECHNIQUE:   After obtaining the patient's consent, F-18 FDG was administered     intravenously.  A PET       scan with concurrent CT imaging was performed from the skull to the thigh with     multiplanar imaging.             RADIONUCLIDE:     11.63 MCI   F18 FDG- I.V.      LABS:                          Blood Glucose 105 mg/dl             FINDINGS:         HEAD/NECK:   Normal.  No pathologic FDG activity.        THORAX:   There is a 3 cm irregular area of consolidation in the left upper lobe    with minimal       increased metabolic activity. No evidence of hypermetabolic adenopathy or     pleural effusion. There       is diffuse tree in bud infiltrate in the left lung. Moderate emphysema is     present.      ABDOMEN:   A large hiatal hernia is present. There is focal hypermetabolic     activity in the distal       esophagus. There is a 1.2 cm right adrenal adenoma. No evidence of abnormal     hypermetabolic activity       in the solid abdominal organs. The patient is status post cholecystectomy.      PELVIS:   Normal.  No pathologic FDG activity.        BONES:   Degenerative changes are present throughout the spine..  No pathologic     FDG activity.        OTHER:   Negative.               CONCLUSION:                1. 3 cm irregular area of consolidation in the left upper lobe with minimal      increased metabolic       activity. Diffuse tree in bud infiltrate throughout the left lung field. An     infectious/inflammatory       processes favored over malignancy.             2. No evidence of hypermetabolic adenopathy or pleural effusion.             3. 1.2 cm right adrenal adenoma.             4. Large hiatal hernia. Focal hypermetabolic activity in the distal esophagus.     Suggest correlation       with upper endoscopy to evaluate for esophagitis or mucosal mass.              ELIAS DONOHUE MD             Electronically Signed and Approved By: ELIAS DONOHUE MD on 2018 at 9:48                           Until signed, this is an unconfirmed preliminary report that may contain      errors and is subject to change.                                              BRYJE:      D:18 0948               [facility]                PACS RADIOLOGY REPORT            Patient: TAMAR WALLIS   Acct: #[id number]   Report: #[id number]            UNIT #: [id number]    DOS: 18 1341      INSURANCE:[facility]   ORDER #:[id number]      LOCATION:[facility]     : 1965            PROVIDERS      ADMITTING:     ATTENDING: Wendie Christensen      FAMILY:  NONE,MD   ORDERING:  Wendie Christensen         OTHER:    DICTATING:  Rangel Browning MD            REQ #:[id number]   EXAM:CHW - CT CHEST with CONTRAST      REASON FOR EXAM:        REASON FOR VISIT:  LUNG NODULE/COPD            *******Signed******         PROCEDURE:   CT CHEST W/ CONTRAST             COMPARISON:   [facility], PET, SKULL BASE TO MID THIGH INITIAL,     2018, 8:52.        [facility], CT, CHEST W/ CONTRAST, 2017, 20:34.             INDICATIONS:   COPD; LUNG NODULE             TECHNIQUE:   After obtaining the patient's consent, CT images were obtained with    non-ionic       intravenous contrast material.               PROTOCOL:     Standard  imaging protocol performed                RADIATION:     DLP: 605mGy*cm          Automated exposure control was utilized to minimize radiation dose.       CONTRAST:   95cc Isovue 370 I.V.             FINDINGS:         There is upper lobe emphysema.  There is scarring and what appears to be perhaps    some surgical       sutures involving the lateral aspect of the left upper lobe.  On the previous     study from 12/14/2017       there was noted a large area of consolidation pleural-based laterally in the     left upper lobe which       has completely resolved on today's study.  There is mild scarring in the     lingular segment of the       left upper lobe.  There are no suspicious pulmonary parenchymal mass is.  There     are several small       left hilar and mediastinal lymph nodes which are unchanged in size.  There is     again noted a large       8.6 cm hiatal hernia.  There is fatty infiltration of the liver.  Surgical clips    suggest prior       cholecystectomy.  Again is noted a small 1.6 cm right adrenal adenoma.             CONCLUSION:                1.  Scarring and postoperative changes in the left upper lobe.  This is     superimposed on       predominantly upper lobe emphysema.  Specifically given the patient's history,     there are no       suspicious pulmonary nodules.             2.  Minimal left hilar and mediastinal adenopathy which is stable.             3.  Fatty infiltration of the liver.              MIKI VILLARREAL MD             Electronically Signed and Approved By: MIKI VILLARREAL MD on 4/24/2018 at 14:56                           Until signed, this is an unconfirmed preliminary report that may contain      errors and is subject to change.                                              SCHJ1:      D:04/24/18 1456            Assessment      Notes      New Medications      * Loratadine (Claritin) 10 MG TABLET: 10 MG PO QDAY #30      * MONTELUKAST SODIUM (Montelukast*) 10 MG TABLET: 10 MG PO  QDAY      * ASCORBIC ACID (Vitamin C*) 500 MG TABLET: 500 MG PO QDAY #60      * Magnesium Amino Acid Chelate (Magnesium*) 100 MG TABLET: 250 MG PO QDAY      New Diagnostics      * Chest W/O Cont CT, SCHEDULED PROCEDURE         Dx: COPD (chronic obstructive pulmonary disease) - J44.9      ASSESSMENT:       1.  Mild COPD.      2.  Emphysema.      3.  History of benign lung mass, status post resection.      4. Former heavy tobacco user in remission.      5.  Anemia, not otherwise specified.      6. Abnormal focal hypermetabolic activity in the distal esophagus.              PLAN:      1.  I have encouraged the patient to follow up with her GI specialist.  She is     scheduled to see them in early June.  She would benefit from EGD and colonoscopy    given her history.      2.  I reviewed pulmonary function test and six minute walk test with her today.     I congratulated her on her on her ongoing smoking cessation.      3. I offered referral to immunologist allergist, but she deferred.      4.  Continue Trelegy ellipta and albuterol as needed.      5. I have reordered CT scan of the chest as this is overdue.  She was suppose to    have one one year from prior, but it was not scheduled, so I have ordered it     today.            Patient Education      ACO BMI High above 25:  Encouraged weight loss      Patient Education Provided:  COPD, How to use an Inhaler, Lung Cancer      Time Spent:  > 50% /[facility]                 Disclaimer: Converted document may not contain table formatting or lab diagrams. Please see [employer] for the authenticated document.

## 2021-09-03 RX ORDER — BREXPIPRAZOLE 0.5 MG/1
TABLET ORAL
COMMUNITY

## 2021-09-03 RX ORDER — CETIRIZINE HYDROCHLORIDE 10 MG/1
TABLET ORAL
COMMUNITY

## 2021-09-03 RX ORDER — DULOXETIN HYDROCHLORIDE 60 MG/1
CAPSULE, DELAYED RELEASE ORAL
COMMUNITY

## 2021-09-03 RX ORDER — DIPHENHYDRAMINE HCL 25 MG
CAPSULE ORAL
COMMUNITY

## 2021-09-03 RX ORDER — METOPROLOL SUCCINATE 25 MG/1
TABLET, EXTENDED RELEASE ORAL
COMMUNITY

## 2021-09-03 RX ORDER — LEVOTHYROXINE SODIUM 112 UG/1
TABLET ORAL
COMMUNITY

## 2021-09-03 RX ORDER — BUSPIRONE HYDROCHLORIDE 10 MG/1
TABLET ORAL
COMMUNITY

## 2021-09-03 RX ORDER — ATORVASTATIN CALCIUM 10 MG/1
TABLET, FILM COATED ORAL
COMMUNITY

## 2021-09-03 RX ORDER — ESOMEPRAZOLE MAGNESIUM 40 MG/1
CAPSULE, DELAYED RELEASE ORAL
COMMUNITY

## 2021-09-07 ENCOUNTER — OFFICE VISIT (OUTPATIENT)
Dept: SURGERY | Facility: CLINIC | Age: 56
End: 2021-09-07

## 2021-09-07 VITALS — WEIGHT: 187.4 LBS | HEIGHT: 61 IN | BODY MASS INDEX: 35.38 KG/M2

## 2021-09-07 DIAGNOSIS — R22.31 ARM MASS, RIGHT: Primary | ICD-10-CM

## 2021-09-07 PROBLEM — R22.30 ARM MASS: Status: ACTIVE | Noted: 2021-09-07

## 2021-09-07 PROCEDURE — 99203 OFFICE O/P NEW LOW 30 MIN: CPT | Performed by: SURGERY

## 2021-09-07 PROCEDURE — 11401 EXC TR-EXT B9+MARG 0.6-1 CM: CPT | Performed by: SURGERY

## 2021-09-07 PROCEDURE — 88305 TISSUE EXAM BY PATHOLOGIST: CPT | Performed by: SURGERY

## 2021-09-07 RX ORDER — MONTELUKAST SODIUM 10 MG/1
10 TABLET ORAL DAILY
COMMUNITY
Start: 2021-09-03

## 2021-09-07 RX ORDER — AMOXICILLIN AND CLAVULANATE POTASSIUM 500; 125 MG/1; MG/1
1 TABLET, FILM COATED ORAL 2 TIMES DAILY
COMMUNITY
Start: 2021-08-04 | End: 2023-04-03

## 2021-09-07 RX ORDER — LEVOCETIRIZINE DIHYDROCHLORIDE 5 MG/1
5 TABLET, FILM COATED ORAL DAILY
COMMUNITY
Start: 2021-08-16

## 2021-09-07 RX ORDER — FLUTICASONE PROPIONATE 50 MCG
2 SPRAY, SUSPENSION (ML) NASAL DAILY
COMMUNITY
Start: 2021-09-03

## 2021-09-07 RX ORDER — DULOXETIN HYDROCHLORIDE 30 MG/1
90 CAPSULE, DELAYED RELEASE ORAL DAILY
COMMUNITY
Start: 2021-09-03

## 2021-09-07 RX ORDER — ALBUTEROL SULFATE 90 UG/1
AEROSOL, METERED RESPIRATORY (INHALATION)
COMMUNITY
Start: 2021-08-09

## 2021-09-07 RX ORDER — OMEPRAZOLE 40 MG/1
40 CAPSULE, DELAYED RELEASE ORAL DAILY
COMMUNITY
Start: 2021-09-03

## 2021-09-07 RX ORDER — HYDROCODONE BITARTRATE AND ACETAMINOPHEN 5; 325 MG/1; MG/1
1 TABLET ORAL EVERY 6 HOURS PRN
Qty: 10 TABLET | Refills: 0 | Status: SHIPPED | OUTPATIENT
Start: 2021-09-07

## 2021-09-07 NOTE — PROGRESS NOTES
Chief Complaint: Cyst (Right forearm)    Subjective         History of Present Illness  Anna Solorio is a 55 y.o. female presents to [facility] to be seen for mass on the right arm.  Patient reports she got bit by a cat several months ago.  Since then it has been healing but she has a very hard firm area on her right forearm which has failed to resolve..    Objective     Past Medical History:   Diagnosis Date   • Allergic rhinitis    • Anemia    • Anxiety    • Depression    • Diverticulitis    • GERD (gastroesophageal reflux disease)    • Mood disorder (CMS/HCC)    • Sleep apnea        Past Surgical History:   Procedure Laterality Date   • APPENDECTOMY     • BREAST SURGERY      benign breast cyst   • COLONOSCOPY  2014 2013   • ENDOSCOPY  2014 2013   • GALLBLADDER SURGERY  2014   • LUNG SURGERY  2015    Left lung nodule removed         Current Outpatient Medications:   •  albuterol sulfate  (90 Base) MCG/ACT inhaler, INHALE TWO PUFFS BY MOUTH EVERY 4 HOURS., Disp: , Rfl:   •  atorvastatin (LIPITOR) 10 MG tablet, atorvastatin 10 mg oral tablet take 1 tablet (10 mg) by oral route once daily at bedtime   Active, Disp: , Rfl:   •  busPIRone (BUSPAR) 10 MG tablet, buspirone 10 mg oral tablet take 1 tablet (10 mg) by oral route 3 times per day   Active, Disp: , Rfl:   •  diphenhydrAMINE (Benadryl Allergy) 25 mg capsule, , Disp: , Rfl:   •  DULoxetine (CYMBALTA) 30 MG capsule, Take 90 mg by mouth Daily., Disp: , Rfl:   •  levocetirizine (XYZAL) 5 MG tablet, Take 5 mg by mouth Daily., Disp: , Rfl:   •  levothyroxine (SYNTHROID, LEVOTHROID) 112 MCG tablet, levothyroxine 112 mcg oral tablet take 1 tablet (112 mcg) by oral route once daily   Active, Disp: , Rfl:   •  metoprolol succinate XL (TOPROL-XL) 25 MG 24 hr tablet, metoprolol succinate 25 mg oral tablet extended release 24 hr take 1 tablet (25 mg) by oral route once daily   Active, Disp: , Rfl:   •  montelukast  "(SINGULAIR) 10 MG tablet, Take 10 mg by mouth Daily., Disp: , Rfl:   •  omeprazole (priLOSEC) 40 MG capsule, Take 40 mg by mouth Daily., Disp: , Rfl:   •  amoxicillin-clavulanate (AUGMENTIN) 500-125 MG per tablet, Take 1 tablet by mouth 2 (Two) Times a Day., Disp: , Rfl:   •  Brexpiprazole (Rexulti) 0.5 MG tablet, Rexulti 0.5 mg oral tablet take 1 tablet (0.5 mg) by oral route once daily   Active, Disp: , Rfl:   •  cetirizine (ZyrTEC Allergy) 10 MG tablet, Zyrtec 10 mg oral tablet take 1 tablet (10 mg) by oral route once daily   Active, Disp: , Rfl:   •  DULoxetine (Cymbalta) 60 MG capsule, Cymbalta 60 mg oral capsule,delayed release(DR/EC) take 1.5 capsules by oral route daily   Active, Disp: , Rfl:   •  esomeprazole (nexIUM) 40 MG capsule, , Disp: , Rfl:   •  fluticasone (FLONASE) 50 MCG/ACT nasal spray, 2 sprays by Each Nare route Daily., Disp: , Rfl:   •  HYDROcodone-acetaminophen (Norco) 5-325 MG per tablet, Take 1 tablet by mouth Every 6 (Six) Hours As Needed for Moderate Pain ., Disp: 10 tablet, Rfl: 0    No Known Allergies     Family History   Problem Relation Age of Onset   • Leukemia Mother    • Leukemia Father        Social History     Socioeconomic History   • Marital status: Single     Spouse name: Not on file   • Number of children: Not on file   • Years of education: Not on file   • Highest education level: Not on file   Tobacco Use   • Smoking status: Former Smoker     Packs/day: 1.00   • Smokeless tobacco: Never Used   Vaping Use   • Vaping Use: Never used   Substance and Sexual Activity   • Alcohol use: Yes     Comment: social       Vital Signs:   Ht 154.9 cm (61\")   Wt 85 kg (187 lb 6.4 oz)   BMI 35.41 kg/m²      Physical Exam  Constitutional:       Appearance: Normal appearance.   Cardiovascular:      Rate and Rhythm: Normal rate.   Pulmonary:      Effort: Pulmonary effort is normal.   Skin:     General: Skin is warm.     Small subcentimeter mass on the right forearm which is nontender but " firm    Result Review :            Procedures   Patient was brought back to the procedure room.  Risk benefits were discussed extensively.  Patient was prepped and draped in standard surgical fashion.  After prepping draping timeout was performed to verify the correct procedure.  We directed local anesthesia around the area of the mass.  Elliptical incision was made around the area of the mass.  It was dissected down sharply and carefully amputated.  I felt around the area of the mass did not feel there were any residual remaining areas of the mass.  We irrigated and held pressure.  The wound was then closed with interrupted subcuticular 4-0 Vicryl stitches.  The wound was dressed with Mastisol and Steri-Strips as well as a overlying bandage.  Patient tolerated the procedure well.         Assessment and Plan    Diagnoses and all orders for this visit:    1. Arm mass, right (Primary)  -     HYDROcodone-acetaminophen (Norco) 5-325 MG per tablet; Take 1 tablet by mouth Every 6 (Six) Hours As Needed for Moderate Pain .  Dispense: 10 tablet; Refill: 0  -     Tissue Pathology Exam        Follow Up   No follow-ups on file.  Patient was given instructions and counseling regarding her condition or for health maintenance advice. Please see specific information pulled into the AVS if appropriate.     Mass was excised in the clinic today.  She was given some pain medication for home.  Advised to avoid heavy lifting until next week.  We will follow up next week for wound inspection    Discussed with the patient - all questions were answered they voiced understanding and agreed to proceed with above plan

## 2021-09-10 LAB
CYTO UR: NORMAL
LAB AP CASE REPORT: NORMAL
LAB AP CLINICAL INFORMATION: NORMAL
PATH REPORT.FINAL DX SPEC: NORMAL
PATH REPORT.GROSS SPEC: NORMAL

## 2021-09-14 ENCOUNTER — OFFICE VISIT (OUTPATIENT)
Dept: SURGERY | Facility: CLINIC | Age: 56
End: 2021-09-14

## 2021-09-14 VITALS — HEIGHT: 61 IN | RESPIRATION RATE: 14 BRPM | BODY MASS INDEX: 35.76 KG/M2 | WEIGHT: 189.4 LBS

## 2021-09-14 DIAGNOSIS — R22.31 MASS OF RIGHT UPPER EXTREMITY: Primary | ICD-10-CM

## 2021-09-14 PROCEDURE — 99024 POSTOP FOLLOW-UP VISIT: CPT | Performed by: SURGERY

## 2021-09-14 NOTE — PROGRESS NOTES
Chief Complaint: Post-op Follow-up    Subjective         History of Present Illness  Anna Solorio is a 55 y.o. female presents to [facility] to be seen for follow-up after excision of an arm mass.  Patient is done well.  Seems to be healing well.  She is not reporting any expected signs or symptoms.    Objective     Past Medical History:   Diagnosis Date   • Allergic rhinitis    • Anemia    • Anxiety    • Depression    • Diverticulitis    • GERD (gastroesophageal reflux disease)    • Mood disorder (CMS/HCC)    • Sleep apnea        Past Surgical History:   Procedure Laterality Date   • APPENDECTOMY     • BREAST SURGERY      benign breast cyst   • COLONOSCOPY  2014 2013   • ENDOSCOPY  2014 2013   • GALLBLADDER SURGERY  2014   • LUNG SURGERY  2015    Left lung nodule removed         Current Outpatient Medications:   •  albuterol sulfate  (90 Base) MCG/ACT inhaler, INHALE TWO PUFFS BY MOUTH EVERY 4 HOURS., Disp: , Rfl:   •  amoxicillin-clavulanate (AUGMENTIN) 500-125 MG per tablet, Take 1 tablet by mouth 2 (Two) Times a Day., Disp: , Rfl:   •  atorvastatin (LIPITOR) 10 MG tablet, atorvastatin 10 mg oral tablet take 1 tablet (10 mg) by oral route once daily at bedtime   Active, Disp: , Rfl:   •  Brexpiprazole (Rexulti) 0.5 MG tablet, Rexulti 0.5 mg oral tablet take 1 tablet (0.5 mg) by oral route once daily   Active, Disp: , Rfl:   •  busPIRone (BUSPAR) 10 MG tablet, buspirone 10 mg oral tablet take 1 tablet (10 mg) by oral route 3 times per day   Active, Disp: , Rfl:   •  cetirizine (ZyrTEC Allergy) 10 MG tablet, Zyrtec 10 mg oral tablet take 1 tablet (10 mg) by oral route once daily   Active, Disp: , Rfl:   •  diphenhydrAMINE (Benadryl Allergy) 25 mg capsule, , Disp: , Rfl:   •  DULoxetine (CYMBALTA) 30 MG capsule, Take 90 mg by mouth Daily., Disp: , Rfl:   •  DULoxetine (Cymbalta) 60 MG capsule, Cymbalta 60 mg oral capsule,delayed release(DR/EC) take 1.5 capsules by  oral route daily   Active, Disp: , Rfl:   •  esomeprazole (nexIUM) 40 MG capsule, , Disp: , Rfl:   •  fluticasone (FLONASE) 50 MCG/ACT nasal spray, 2 sprays by Each Nare route Daily., Disp: , Rfl:   •  HYDROcodone-acetaminophen (Norco) 5-325 MG per tablet, Take 1 tablet by mouth Every 6 (Six) Hours As Needed for Moderate Pain ., Disp: 10 tablet, Rfl: 0  •  levocetirizine (XYZAL) 5 MG tablet, Take 5 mg by mouth Daily., Disp: , Rfl:   •  levothyroxine (SYNTHROID, LEVOTHROID) 112 MCG tablet, levothyroxine 112 mcg oral tablet take 1 tablet (112 mcg) by oral route once daily   Active, Disp: , Rfl:   •  metoprolol succinate XL (TOPROL-XL) 25 MG 24 hr tablet, metoprolol succinate 25 mg oral tablet extended release 24 hr take 1 tablet (25 mg) by oral route once daily   Active, Disp: , Rfl:   •  montelukast (SINGULAIR) 10 MG tablet, Take 10 mg by mouth Daily., Disp: , Rfl:   •  omeprazole (priLOSEC) 40 MG capsule, Take 40 mg by mouth Daily., Disp: , Rfl:     No Known Allergies     Family History   Problem Relation Age of Onset   • Leukemia Mother    • Leukemia Father        Social History     Socioeconomic History   • Marital status: Single     Spouse name: Not on file   • Number of children: Not on file   • Years of education: Not on file   • Highest education level: Not on file   Tobacco Use   • Smoking status: Former Smoker     Packs/day: 1.00   • Smokeless tobacco: Never Used   Vaping Use   • Vaping Use: Never used   Substance and Sexual Activity   • Alcohol use: Yes     Comment: social        Physical Exam  Constitutional:       Appearance: Normal appearance.   Cardiovascular:      Rate and Rhythm: Normal rate.   Pulmonary:      Effort: Pulmonary effort is normal.   Abdominal:      Palpations: Abdomen is soft.   Skin:     General: Skin is warm.        Post Surgical Incision  Surgical wound: Excision site healing well.  There is some minor erythema but this seems to be more due to tension there is no signs of  infection    Result Review :               Assessment and Plan    Diagnoses and all orders for this visit:    1. Mass of right upper extremity (Primary)        Follow Up   No follow-ups on file.  Patient was given instructions and counseling regarding her condition or for health maintenance advice. Please see specific information pulled into the AVS if appropriate.     Doing well postoperatively.  Her pathology just showed some scar tissue and fat necrosis.  At this point time she can follow-up with me as needed and call with any questions or concerns    Discussed with the patient - all questions were answered they voiced understanding and agreed to proceed with above plan

## 2022-02-10 ENCOUNTER — TRANSCRIBE ORDERS (OUTPATIENT)
Dept: ADMINISTRATIVE | Facility: HOSPITAL | Age: 57
End: 2022-02-10

## 2022-02-10 DIAGNOSIS — Z12.31 SCREENING MAMMOGRAM FOR BREAST CANCER: Primary | ICD-10-CM

## 2022-02-21 ENCOUNTER — HOSPITAL ENCOUNTER (OUTPATIENT)
Dept: MAMMOGRAPHY | Facility: HOSPITAL | Age: 57
Discharge: HOME | End: 2022-02-21
Admitting: NURSE PRACTITIONER

## 2022-02-21 DIAGNOSIS — Z12.31 SCREENING MAMMOGRAM FOR BREAST CANCER: ICD-10-CM

## 2022-02-21 PROCEDURE — 77067 SCR MAMMO BI INCL CAD: CPT

## 2022-08-23 ENCOUNTER — TRANSCRIBE ORDERS (OUTPATIENT)
Dept: ADMINISTRATIVE | Facility: HOSPITAL | Age: 57
End: 2022-08-23

## 2022-08-23 DIAGNOSIS — Z87.891 FORMER SMOKER: Primary | ICD-10-CM

## 2022-08-31 ENCOUNTER — HOSPITAL ENCOUNTER (OUTPATIENT)
Dept: CT IMAGING | Facility: HOSPITAL | Age: 57
Discharge: HOME | End: 2022-08-31
Admitting: NURSE PRACTITIONER

## 2022-08-31 ENCOUNTER — APPOINTMENT (OUTPATIENT)
Dept: CT IMAGING | Facility: HOSPITAL | Age: 57
End: 2022-08-31

## 2022-08-31 DIAGNOSIS — Z87.891 FORMER SMOKER: ICD-10-CM

## 2022-08-31 PROCEDURE — 71271 CT THORAX LUNG CANCER SCR C-: CPT

## 2022-11-03 ENCOUNTER — TRANSCRIBE ORDERS (OUTPATIENT)
Dept: ADMINISTRATIVE | Facility: HOSPITAL | Age: 57
End: 2022-11-03

## 2022-11-03 DIAGNOSIS — R50.9 FEVER OF UNKNOWN ORIGIN: Primary | ICD-10-CM

## 2022-11-11 ENCOUNTER — HOSPITAL ENCOUNTER (OUTPATIENT)
Dept: CT IMAGING | Facility: HOSPITAL | Age: 57
Discharge: HOME | End: 2022-11-11
Admitting: NURSE PRACTITIONER

## 2022-11-11 DIAGNOSIS — R50.9 FEVER OF UNKNOWN ORIGIN: ICD-10-CM

## 2022-11-11 PROCEDURE — 74176 CT ABD & PELVIS W/O CONTRAST: CPT

## 2023-04-03 ENCOUNTER — OFFICE VISIT (OUTPATIENT)
Dept: PULMONOLOGY | Facility: CLINIC | Age: 58
End: 2023-04-03
Payer: COMMERCIAL

## 2023-04-03 ENCOUNTER — HOSPITAL ENCOUNTER (OUTPATIENT)
Dept: GENERAL RADIOLOGY | Facility: HOSPITAL | Age: 58
Discharge: HOME | End: 2023-04-03
Admitting: INTERNAL MEDICINE
Payer: COMMERCIAL

## 2023-04-03 VITALS
RESPIRATION RATE: 16 BRPM | DIASTOLIC BLOOD PRESSURE: 104 MMHG | SYSTOLIC BLOOD PRESSURE: 158 MMHG | WEIGHT: 185.6 LBS | OXYGEN SATURATION: 93 % | BODY MASS INDEX: 35.04 KG/M2 | HEIGHT: 61 IN | TEMPERATURE: 98.2 F | HEART RATE: 91 BPM

## 2023-04-03 DIAGNOSIS — E66.01 MORBID (SEVERE) OBESITY DUE TO EXCESS CALORIES: ICD-10-CM

## 2023-04-03 DIAGNOSIS — J44.1 COPD WITH ACUTE EXACERBATION: ICD-10-CM

## 2023-04-03 DIAGNOSIS — F17.201 TOBACCO ABUSE, IN REMISSION: ICD-10-CM

## 2023-04-03 DIAGNOSIS — J44.1 COPD WITH ACUTE EXACERBATION: Primary | ICD-10-CM

## 2023-04-03 PROCEDURE — 99203 OFFICE O/P NEW LOW 30 MIN: CPT | Performed by: INTERNAL MEDICINE

## 2023-04-03 PROCEDURE — 71046 X-RAY EXAM CHEST 2 VIEWS: CPT

## 2023-04-03 RX ORDER — AMOXICILLIN 500 MG/1
500 CAPSULE ORAL 3 TIMES DAILY
Qty: 21 CAPSULE | Refills: 0 | Status: SHIPPED | OUTPATIENT
Start: 2023-04-03 | End: 2023-04-10

## 2023-04-03 RX ORDER — TIOTROPIUM BROMIDE AND OLODATEROL 3.124; 2.736 UG/1; UG/1
2 SPRAY, METERED RESPIRATORY (INHALATION)
Qty: 1 EACH | Refills: 11 | Status: SHIPPED | OUTPATIENT
Start: 2023-04-03 | End: 2023-04-04

## 2023-04-03 RX ORDER — TIOTROPIUM BROMIDE AND OLODATEROL 3.124; 2.736 UG/1; UG/1
2 SPRAY, METERED RESPIRATORY (INHALATION)
Qty: 2 EACH | Refills: 0 | COMMUNITY
Start: 2023-04-03 | End: 2023-04-04

## 2023-04-03 NOTE — PROGRESS NOTES
Pulmonary Consultation    Moris Dia MD,    Thank you for asking me to see Anna Solorio for   Chief Complaint   Patient presents with   • Establish Care   • COPD   • Cough     At night cough increases   • Nasal Congestion   • Sore Throat   .      History of Present Illness  Anna Solorio is a 57 y.o. female with a PMH significant for tobacco abuse and COPD presents for evaluation patient complains of cough with wheeze and mucoid expectoration off-and-on she takes only albuterol as needed patient has been having sinus congestion and drainage along with some soreness in her throat for past week she also complains of dyspnea on exertion      Tobacco use history:  Former smoker      Review of Systems: History obtained from chart review and the patient.  Review of Systems   Respiratory: Positive for cough, shortness of breath and wheezing.    All other systems reviewed and are negative.    As described in the HPI. Otherwise, remainder of ROS (14 systems) were negative.    Patient Active Problem List   Diagnosis   • Arm mass         Current Outpatient Medications:   •  albuterol sulfate  (90 Base) MCG/ACT inhaler, INHALE TWO PUFFS BY MOUTH EVERY 4 HOURS., Disp: , Rfl:   •  atorvastatin (LIPITOR) 10 MG tablet, atorvastatin 10 mg oral tablet take 1 tablet (10 mg) by oral route once daily at bedtime   Active, Disp: , Rfl:   •  busPIRone (BUSPAR) 10 MG tablet, buspirone 10 mg oral tablet take 1 tablet (10 mg) by oral route 3 times per day   Active, Disp: , Rfl:   •  DULoxetine (CYMBALTA) 60 MG capsule, Cymbalta 60 mg oral capsule,delayed release(DR/EC) take 1.5 capsules by oral route daily   Active, Disp: , Rfl:   •  fluticasone (FLONASE) 50 MCG/ACT nasal spray, 2 sprays by Each Nare route Daily., Disp: , Rfl:   •  levocetirizine (XYZAL) 5 MG tablet, Take 1 tablet by mouth Daily., Disp: , Rfl:   •  metoprolol succinate XL (TOPROL-XL) 25 MG 24 hr tablet, metoprolol succinate 25 mg oral tablet extended  release 24 hr take 1 tablet (25 mg) by oral route once daily   Active, Disp: , Rfl:   •  montelukast (SINGULAIR) 10 MG tablet, Take 1 tablet by mouth Daily., Disp: , Rfl:   •  omeprazole (priLOSEC) 40 MG capsule, Take 1 capsule by mouth Daily., Disp: , Rfl:   •  amoxicillin (AMOXIL) 500 MG capsule, Take 1 capsule by mouth 3 (Three) Times a Day for 7 days., Disp: 21 capsule, Rfl: 0  •  Brexpiprazole (Rexulti) 0.5 MG tablet, Rexulti 0.5 mg oral tablet take 1 tablet (0.5 mg) by oral route once daily   Active (Patient not taking: Reported on 4/3/2023), Disp: , Rfl:   •  cetirizine (zyrTEC) 10 MG tablet, Zyrtec 10 mg oral tablet take 1 tablet (10 mg) by oral route once daily   Active (Patient not taking: Reported on 4/3/2023), Disp: , Rfl:   •  diphenhydrAMINE (BENADRYL) 25 mg capsule, , Disp: , Rfl:   •  DULoxetine (CYMBALTA) 30 MG capsule, Take 90 mg by mouth Daily. (Patient not taking: Reported on 4/3/2023), Disp: , Rfl:   •  esomeprazole (nexIUM) 40 MG capsule, , Disp: , Rfl:   •  HYDROcodone-acetaminophen (Norco) 5-325 MG per tablet, Take 1 tablet by mouth Every 6 (Six) Hours As Needed for Moderate Pain . (Patient not taking: Reported on 4/3/2023), Disp: 10 tablet, Rfl: 0  •  levothyroxine (SYNTHROID, LEVOTHROID) 112 MCG tablet, levothyroxine 112 mcg oral tablet take 1 tablet (112 mcg) by oral route once daily   Active (Patient not taking: Reported on 4/3/2023), Disp: , Rfl:   •  tiotropium bromide-olodaterol (Stiolto Respimat) 2.5-2.5 MCG/ACT aerosol solution inhaler, Inhale 2 puffs Daily., Disp: 1 each, Rfl: 11    No Known Allergies    Past Medical History:   Diagnosis Date   • Allergic rhinitis    • Anemia    • Anxiety    • Depression    • Diverticulitis    • GERD (gastroesophageal reflux disease)    • Mood disorder    • Sleep apnea      Past Surgical History:   Procedure Laterality Date   • APPENDECTOMY     • BREAST BIOPSY Bilateral     benign   • BREAST SURGERY      benign breast cyst   • COLONOSCOPY  2014     "2013   • ENDOSCOPY  2014   • GALLBLADDER SURGERY     • LUNG SURGERY  2015    Left lung nodule removed     Social History     Socioeconomic History   • Marital status: Single   Tobacco Use   • Smoking status: Former     Packs/day: 1.50     Years: 36.00     Pack years: 54.00     Types: Cigarettes     Start date:      Quit date: 2015     Years since quittin.8     Passive exposure: Past   • Smokeless tobacco: Never   Vaping Use   • Vaping Use: Former   • Substances: THC   Substance and Sexual Activity   • Alcohol use: Yes     Comment: social     Family History   Problem Relation Age of Onset   • Leukemia Mother    • Leukemia Father        No radiology results for the last 90 days.        Objective     Blood pressure (!) 158/104, pulse 91, temperature 98.2 °F (36.8 °C), temperature source Tympanic, resp. rate 16, height 154.9 cm (61\"), weight 84.2 kg (185 lb 9.6 oz), SpO2 93 %.  Physical Exam  Vitals and nursing note reviewed.   Constitutional:       Appearance: She is obese.   HENT:      Head: Normocephalic and atraumatic.      Nose: Congestion and rhinorrhea present.      Mouth/Throat:      Mouth: Mucous membranes are moist.   Eyes:      Conjunctiva/sclera: Conjunctivae normal.      Pupils: Pupils are equal, round, and reactive to light.   Cardiovascular:      Rate and Rhythm: Normal rate and regular rhythm.      Pulses: Normal pulses.      Heart sounds: Normal heart sounds.   Pulmonary:      Effort: Pulmonary effort is normal.      Breath sounds: Rhonchi present.   Abdominal:      General: Abdomen is flat. Bowel sounds are normal.      Palpations: Abdomen is soft.   Musculoskeletal:         General: Normal range of motion.      Cervical back: Normal range of motion and neck supple.   Skin:     General: Skin is warm.      Capillary Refill: Capillary refill takes less than 2 seconds.   Neurological:      General: No focal deficit present.      Mental Status: She is alert and oriented to person, " place, and time.   Psychiatric:         Mood and Affect: Mood normal.       Immunization History   Administered Date(s) Administered   • COVID-19 (MODERNA) 1st, 2nd, 3rd Dose Only 02/03/2021, 03/03/2021, 11/12/2021   • COVID-19 (MODERNA) BIVALENT BOOSTER 12+YRS 10/26/2022   • FluLaval/Fluzone >6mos 10/13/2022            Assessment & Plan     Diagnoses and all orders for this visit:    1. COPD with acute exacerbation (Primary)  -     XR Chest 2 View; Future  -     Full Pulmonary Function Test With Bronchodilator & ABG; Future    2. Morbid (severe) obesity due to excess calories  -     XR Chest 2 View; Future  -     Full Pulmonary Function Test With Bronchodilator & ABG; Future    3. Tobacco abuse, in remission  -     XR Chest 2 View; Future  -     Full Pulmonary Function Test With Bronchodilator & ABG; Future    Other orders  -     tiotropium bromide-olodaterol (Stiolto Respimat) 2.5-2.5 MCG/ACT aerosol solution inhaler; Inhale 2 puffs Daily.  Dispense: 1 each; Refill: 11  -     amoxicillin (AMOXIL) 500 MG capsule; Take 1 capsule by mouth 3 (Three) Times a Day for 7 days.  Dispense: 21 capsule; Refill: 0         Discussion/ Recommendations:   Patient is advised to reduce weight her BMI is 35.07  Patient is advised regular exercise  Continue albuterol as needed  Stiolto twice daily samples given  Will order chest x-ray and PFTs for evaluation  Amoxil for 1 week for COPD exacerbation  Discussed vaccination and recommended    Class 2 Severe Obesity (BMI >=35 and <=39.9). Obesity-related health conditions include the following: osteoarthritis. Obesity is unchanged. BMI is is above average; BMI management plan is completed. We discussed low calorie, low carb based diet program, portion control and increasing exercise.           Return in about 3 months (around 7/3/2023).      Thank you for allowing me to participate in the care of Anna Solorio. Please do not hesitate to contact me with any questions.         This  document has been electronically signed by Gera Colon MD on April 3, 2023 13:28 EDT

## 2023-04-04 ENCOUNTER — TELEPHONE (OUTPATIENT)
Dept: PULMONOLOGY | Facility: CLINIC | Age: 58
End: 2023-04-04
Payer: COMMERCIAL

## 2023-04-10 ENCOUNTER — PATIENT ROUNDING (BHMG ONLY) (OUTPATIENT)
Dept: PULMONOLOGY | Facility: CLINIC | Age: 58
End: 2023-04-10
Payer: COMMERCIAL

## 2023-04-10 NOTE — PROGRESS NOTES
April 10, 2023    Hello, may I speak with Anna Solorio?    My name is Anna     I am  with [facility]  [address]  [address]  [place] [address] [phone].    Before we get started may I verify your date of birth? 1965    I am calling to officially welcome you to our practice and ask about your recent visit. Is this a good time to talk? My chart message sent for patient rounding.

## 2023-05-05 ENCOUNTER — HOSPITAL ENCOUNTER (OUTPATIENT)
Dept: RESPIRATORY THERAPY | Facility: HOSPITAL | Age: 58
Discharge: HOME | End: 2023-05-05
Payer: COMMERCIAL

## 2023-05-05 DIAGNOSIS — F17.201 TOBACCO ABUSE, IN REMISSION: ICD-10-CM

## 2023-05-05 DIAGNOSIS — J44.1 COPD WITH ACUTE EXACERBATION: ICD-10-CM

## 2023-05-05 DIAGNOSIS — E66.01 MORBID (SEVERE) OBESITY DUE TO EXCESS CALORIES: ICD-10-CM

## 2023-05-05 LAB
ARTERIAL PATENCY WRIST A: POSITIVE
BASE EXCESS BLDA CALC-SCNC: 10 MMOL/L (ref -2–2)
BDY SITE: ABNORMAL
COHGB MFR BLD: 0.2 % (ref 0–1.5)
FHHB: 6.4 % (ref 0–5)
HCO3 BLDA-SCNC: 34.4 MMOL/L (ref 22–26)
HGB BLDA-MCNC: 12.5 G/DL (ref 11.7–14.6)
LACTATE BLDA-SCNC: ABNORMAL MMOL/L
METHGB BLD QL: 0.1 % (ref 0–1.5)
MODALITY: ABNORMAL
OXYHGB MFR BLDV: 93.3 % (ref 94–99)
PCO2 BLDA: 45.5 MM HG (ref 35–45)
PH BLDA: 7.5 PH UNITS (ref 7.35–7.45)
PO2 BLDA: 70.4 MM HG (ref 80–100)
SAO2 % BLDCOA: 93.6 % (ref 95–99)

## 2023-05-05 PROCEDURE — 82375 ASSAY CARBOXYHB QUANT: CPT | Performed by: INTERNAL MEDICINE

## 2023-05-05 PROCEDURE — 36600 WITHDRAWAL OF ARTERIAL BLOOD: CPT | Performed by: INTERNAL MEDICINE

## 2023-05-05 PROCEDURE — 94729 DIFFUSING CAPACITY: CPT

## 2023-05-05 PROCEDURE — 83050 HGB METHEMOGLOBIN QUAN: CPT | Performed by: INTERNAL MEDICINE

## 2023-05-05 PROCEDURE — 82805 BLOOD GASES W/O2 SATURATION: CPT | Performed by: INTERNAL MEDICINE

## 2023-05-05 PROCEDURE — 94010 BREATHING CAPACITY TEST: CPT

## 2023-05-05 PROCEDURE — 94726 PLETHYSMOGRAPHY LUNG VOLUMES: CPT

## 2023-05-05 RX ORDER — LEVALBUTEROL INHALATION SOLUTION 1.25 MG/3ML
1.25 SOLUTION RESPIRATORY (INHALATION) ONCE
Status: COMPLETED | OUTPATIENT
Start: 2023-05-05 | End: 2023-05-05

## 2023-05-05 RX ADMIN — LEVALBUTEROL HYDROCHLORIDE 1.25 MG: 1.25 SOLUTION RESPIRATORY (INHALATION) at 07:56

## 2023-05-10 ENCOUNTER — APPOINTMENT (OUTPATIENT)
Dept: CT IMAGING | Facility: HOSPITAL | Age: 58
DRG: 871 | End: 2023-05-10
Payer: COMMERCIAL

## 2023-05-10 ENCOUNTER — HOSPITAL ENCOUNTER (INPATIENT)
Facility: HOSPITAL | Age: 58
LOS: 2 days | Discharge: HOME | DRG: 871 | End: 2023-05-12
Attending: EMERGENCY MEDICINE | Admitting: STUDENT IN AN ORGANIZED HEALTH CARE EDUCATION/TRAINING PROGRAM
Payer: COMMERCIAL

## 2023-05-10 ENCOUNTER — APPOINTMENT (OUTPATIENT)
Dept: GENERAL RADIOLOGY | Facility: HOSPITAL | Age: 58
DRG: 871 | End: 2023-05-10
Payer: COMMERCIAL

## 2023-05-10 DIAGNOSIS — J44.1 COPD WITH ACUTE EXACERBATION: ICD-10-CM

## 2023-05-10 DIAGNOSIS — R06.02 SHORTNESS OF BREATH: Primary | ICD-10-CM

## 2023-05-10 LAB
ALBUMIN SERPL-MCNC: 4.1 G/DL (ref 3.5–5.2)
ALBUMIN/GLOB SERPL: 1.2 G/DL
ALP SERPL-CCNC: 108 U/L (ref 39–117)
ALT SERPL W P-5'-P-CCNC: 20 U/L (ref 1–33)
ANION GAP SERPL CALCULATED.3IONS-SCNC: 10.2 MMOL/L (ref 5–15)
AST SERPL-CCNC: 18 U/L (ref 1–32)
BASOPHILS # BLD AUTO: 0.06 10*3/MM3 (ref 0–0.2)
BASOPHILS NFR BLD AUTO: 0.3 % (ref 0–1.5)
BILIRUB SERPL-MCNC: 0.4 MG/DL (ref 0–1.2)
BUN SERPL-MCNC: 7 MG/DL (ref 6–20)
BUN/CREAT SERPL: 10.4 (ref 7–25)
CALCIUM SPEC-SCNC: 8.5 MG/DL (ref 8.6–10.5)
CHLORIDE SERPL-SCNC: 97 MMOL/L (ref 98–107)
CO2 SERPL-SCNC: 32.8 MMOL/L (ref 22–29)
CREAT SERPL-MCNC: 0.67 MG/DL (ref 0.57–1)
DEPRECATED RDW RBC AUTO: 42.5 FL (ref 37–54)
EGFRCR SERPLBLD CKD-EPI 2021: 101.5 ML/MIN/1.73
EOSINOPHIL # BLD AUTO: 0.12 10*3/MM3 (ref 0–0.4)
EOSINOPHIL NFR BLD AUTO: 0.6 % (ref 0.3–6.2)
ERYTHROCYTE [DISTWIDTH] IN BLOOD BY AUTOMATED COUNT: 14.1 % (ref 12.3–15.4)
GLOBULIN UR ELPH-MCNC: 3.3 GM/DL
GLUCOSE SERPL-MCNC: 173 MG/DL (ref 65–99)
HCT VFR BLD AUTO: 32.7 % (ref 34–46.6)
HGB BLD-MCNC: 10.8 G/DL (ref 12–15.9)
HOLD SPECIMEN: NORMAL
HOLD SPECIMEN: NORMAL
IMM GRANULOCYTES # BLD AUTO: 0.12 10*3/MM3 (ref 0–0.05)
IMM GRANULOCYTES NFR BLD AUTO: 0.6 % (ref 0–0.5)
LYMPHOCYTES # BLD AUTO: 1.06 10*3/MM3 (ref 0.7–3.1)
LYMPHOCYTES NFR BLD AUTO: 5.6 % (ref 19.6–45.3)
MAGNESIUM SERPL-MCNC: 1 MG/DL (ref 1.6–2.6)
MCH RBC QN AUTO: 27.6 PG (ref 26.6–33)
MCHC RBC AUTO-ENTMCNC: 33 G/DL (ref 31.5–35.7)
MCV RBC AUTO: 83.4 FL (ref 79–97)
MONOCYTES # BLD AUTO: 0.62 10*3/MM3 (ref 0.1–0.9)
MONOCYTES NFR BLD AUTO: 3.3 % (ref 5–12)
NEUTROPHILS NFR BLD AUTO: 17.07 10*3/MM3 (ref 1.7–7)
NEUTROPHILS NFR BLD AUTO: 89.6 % (ref 42.7–76)
NRBC BLD AUTO-RTO: 0 /100 WBC (ref 0–0.2)
NT-PROBNP SERPL-MCNC: 142.1 PG/ML (ref 0–900)
PHOSPHATE SERPL-MCNC: 2.9 MG/DL (ref 2.5–4.5)
PLATELET # BLD AUTO: 423 10*3/MM3 (ref 140–450)
PMV BLD AUTO: 9.3 FL (ref 6–12)
POTASSIUM SERPL-SCNC: 2.6 MMOL/L (ref 3.5–5.2)
PROCALCITONIN SERPL-MCNC: 0.12 NG/ML (ref 0–0.25)
PROT SERPL-MCNC: 7.4 G/DL (ref 6–8.5)
RBC # BLD AUTO: 3.92 10*6/MM3 (ref 3.77–5.28)
SODIUM SERPL-SCNC: 140 MMOL/L (ref 136–145)
TROPONIN T SERPL HS-MCNC: 7 NG/L
WBC NRBC COR # BLD: 19.05 10*3/MM3 (ref 3.4–10.8)
WHOLE BLOOD HOLD COAG: NORMAL
WHOLE BLOOD HOLD SPECIMEN: NORMAL

## 2023-05-10 PROCEDURE — 80053 COMPREHEN METABOLIC PANEL: CPT

## 2023-05-10 PROCEDURE — 93005 ELECTROCARDIOGRAM TRACING: CPT

## 2023-05-10 PROCEDURE — 85025 COMPLETE CBC W/AUTO DIFF WBC: CPT

## 2023-05-10 PROCEDURE — 83880 ASSAY OF NATRIURETIC PEPTIDE: CPT

## 2023-05-10 PROCEDURE — 84145 PROCALCITONIN (PCT): CPT | Performed by: STUDENT IN AN ORGANIZED HEALTH CARE EDUCATION/TRAINING PROGRAM

## 2023-05-10 PROCEDURE — 94799 UNLISTED PULMONARY SVC/PX: CPT

## 2023-05-10 PROCEDURE — 25510000001 IOPAMIDOL PER 1 ML: Performed by: EMERGENCY MEDICINE

## 2023-05-10 PROCEDURE — 71260 CT THORAX DX C+: CPT

## 2023-05-10 PROCEDURE — 86738 MYCOPLASMA ANTIBODY: CPT | Performed by: FAMILY MEDICINE

## 2023-05-10 PROCEDURE — 93005 ELECTROCARDIOGRAM TRACING: CPT | Performed by: EMERGENCY MEDICINE

## 2023-05-10 PROCEDURE — 84484 ASSAY OF TROPONIN QUANT: CPT

## 2023-05-10 PROCEDURE — 99285 EMERGENCY DEPT VISIT HI MDM: CPT

## 2023-05-10 PROCEDURE — 71045 X-RAY EXAM CHEST 1 VIEW: CPT

## 2023-05-10 PROCEDURE — 83735 ASSAY OF MAGNESIUM: CPT | Performed by: STUDENT IN AN ORGANIZED HEALTH CARE EDUCATION/TRAINING PROGRAM

## 2023-05-10 PROCEDURE — 84100 ASSAY OF PHOSPHORUS: CPT | Performed by: STUDENT IN AN ORGANIZED HEALTH CARE EDUCATION/TRAINING PROGRAM

## 2023-05-10 PROCEDURE — 25010000002 METHYLPREDNISOLONE PER 125 MG: Performed by: EMERGENCY MEDICINE

## 2023-05-10 PROCEDURE — 93010 ELECTROCARDIOGRAM REPORT: CPT | Performed by: INTERNAL MEDICINE

## 2023-05-10 RX ORDER — METHYLPREDNISOLONE SODIUM SUCCINATE 125 MG/2ML
125 INJECTION, POWDER, LYOPHILIZED, FOR SOLUTION INTRAMUSCULAR; INTRAVENOUS ONCE
Status: COMPLETED | OUTPATIENT
Start: 2023-05-10 | End: 2023-05-10

## 2023-05-10 RX ORDER — SODIUM CHLORIDE 0.9 % (FLUSH) 0.9 %
10 SYRINGE (ML) INJECTION AS NEEDED
Status: DISCONTINUED | OUTPATIENT
Start: 2023-05-10 | End: 2023-05-12 | Stop reason: HOSPADM

## 2023-05-10 RX ORDER — CEFTRIAXONE SODIUM 1 G/50ML
1 INJECTION, SOLUTION INTRAVENOUS EVERY 24 HOURS
Status: CANCELLED | OUTPATIENT
Start: 2023-05-10 | End: 2023-05-15

## 2023-05-10 RX ORDER — LEVOTHYROXINE SODIUM 112 UG/1
112 TABLET ORAL DAILY
COMMUNITY

## 2023-05-10 RX ORDER — ALBUTEROL SULFATE 2.5 MG/3ML
7.5 SOLUTION RESPIRATORY (INHALATION) CONTINUOUS
Status: DISCONTINUED | OUTPATIENT
Start: 2023-05-10 | End: 2023-05-12 | Stop reason: HOSPADM

## 2023-05-10 RX ORDER — DIPHENHYDRAMINE HCL 25 MG
25 CAPSULE ORAL DAILY
COMMUNITY

## 2023-05-10 RX ORDER — POTASSIUM CHLORIDE 750 MG/1
40 CAPSULE, EXTENDED RELEASE ORAL ONCE
Status: COMPLETED | OUTPATIENT
Start: 2023-05-10 | End: 2023-05-10

## 2023-05-10 RX ADMIN — ALBUTEROL SULFATE 7.5 MG: 2.5 SOLUTION RESPIRATORY (INHALATION) at 21:12

## 2023-05-10 RX ADMIN — IOPAMIDOL 100 ML: 755 INJECTION, SOLUTION INTRAVENOUS at 22:00

## 2023-05-10 RX ADMIN — METHYLPREDNISOLONE SODIUM SUCCINATE 125 MG: 125 INJECTION INTRAMUSCULAR; INTRAVENOUS at 21:16

## 2023-05-10 RX ADMIN — POTASSIUM CHLORIDE 40 MEQ: 10 CAPSULE, COATED, EXTENDED RELEASE ORAL at 23:00

## 2023-05-11 LAB
ALBUMIN SERPL-MCNC: 3.9 G/DL (ref 3.5–5.2)
ALBUMIN/GLOB SERPL: 1.1 G/DL
ALP SERPL-CCNC: 100 U/L (ref 39–117)
ALT SERPL W P-5'-P-CCNC: 19 U/L (ref 1–33)
ANION GAP SERPL CALCULATED.3IONS-SCNC: 11.8 MMOL/L (ref 5–15)
AST SERPL-CCNC: 17 U/L (ref 1–32)
BASOPHILS # BLD AUTO: 0.04 10*3/MM3 (ref 0–0.2)
BASOPHILS NFR BLD AUTO: 0.3 % (ref 0–1.5)
BILIRUB SERPL-MCNC: 0.3 MG/DL (ref 0–1.2)
BUN SERPL-MCNC: 5 MG/DL (ref 6–20)
BUN/CREAT SERPL: 8.5 (ref 7–25)
CALCIUM SPEC-SCNC: 8.5 MG/DL (ref 8.6–10.5)
CHLORIDE SERPL-SCNC: 97 MMOL/L (ref 98–107)
CO2 SERPL-SCNC: 31.2 MMOL/L (ref 22–29)
CREAT SERPL-MCNC: 0.59 MG/DL (ref 0.57–1)
DEPRECATED RDW RBC AUTO: 42.5 FL (ref 37–54)
EGFRCR SERPLBLD CKD-EPI 2021: 104.6 ML/MIN/1.73
EOSINOPHIL # BLD AUTO: 0 10*3/MM3 (ref 0–0.4)
EOSINOPHIL NFR BLD AUTO: 0 % (ref 0.3–6.2)
ERYTHROCYTE [DISTWIDTH] IN BLOOD BY AUTOMATED COUNT: 14 % (ref 12.3–15.4)
FLUAV AG NPH QL: NEGATIVE
FLUBV AG NPH QL IA: NEGATIVE
GLOBULIN UR ELPH-MCNC: 3.7 GM/DL
GLUCOSE BLDC GLUCOMTR-MCNC: 196 MG/DL (ref 70–99)
GLUCOSE BLDC GLUCOMTR-MCNC: 204 MG/DL (ref 70–99)
GLUCOSE SERPL-MCNC: 235 MG/DL (ref 65–99)
HBA1C MFR BLD: 7 % (ref 4.8–5.6)
HCT VFR BLD AUTO: 34.3 % (ref 34–46.6)
HGB BLD-MCNC: 11.4 G/DL (ref 12–15.9)
IMM GRANULOCYTES # BLD AUTO: 0.06 10*3/MM3 (ref 0–0.05)
IMM GRANULOCYTES NFR BLD AUTO: 0.4 % (ref 0–0.5)
L PNEUMO1 AG UR QL IA: NEGATIVE
LYMPHOCYTES # BLD AUTO: 0.43 10*3/MM3 (ref 0.7–3.1)
LYMPHOCYTES NFR BLD AUTO: 2.8 % (ref 19.6–45.3)
M PNEUMO IGM SER QL: NEGATIVE
MAGNESIUM SERPL-MCNC: 1.2 MG/DL (ref 1.6–2.6)
MCH RBC QN AUTO: 27.7 PG (ref 26.6–33)
MCHC RBC AUTO-ENTMCNC: 33.2 G/DL (ref 31.5–35.7)
MCV RBC AUTO: 83.5 FL (ref 79–97)
MONOCYTES # BLD AUTO: 0.04 10*3/MM3 (ref 0.1–0.9)
MONOCYTES NFR BLD AUTO: 0.3 % (ref 5–12)
NEUTROPHILS NFR BLD AUTO: 15.06 10*3/MM3 (ref 1.7–7)
NEUTROPHILS NFR BLD AUTO: 96.2 % (ref 42.7–76)
NRBC BLD AUTO-RTO: 0 /100 WBC (ref 0–0.2)
PHOSPHATE SERPL-MCNC: 3.2 MG/DL (ref 2.5–4.5)
PLATELET # BLD AUTO: 435 10*3/MM3 (ref 140–450)
PMV BLD AUTO: 9.6 FL (ref 6–12)
POTASSIUM SERPL-SCNC: 3.5 MMOL/L (ref 3.5–5.2)
PROT SERPL-MCNC: 7.6 G/DL (ref 6–8.5)
QT INTERVAL: 354 MS
RBC # BLD AUTO: 4.11 10*6/MM3 (ref 3.77–5.28)
S PNEUM AG SPEC QL LA: NEGATIVE
SARS-COV-2 RNA RESP QL NAA+PROBE: NOT DETECTED
SODIUM SERPL-SCNC: 140 MMOL/L (ref 136–145)
WBC NRBC COR # BLD: 15.63 10*3/MM3 (ref 3.4–10.8)

## 2023-05-11 PROCEDURE — 83735 ASSAY OF MAGNESIUM: CPT | Performed by: STUDENT IN AN ORGANIZED HEALTH CARE EDUCATION/TRAINING PROGRAM

## 2023-05-11 PROCEDURE — 94799 UNLISTED PULMONARY SVC/PX: CPT

## 2023-05-11 PROCEDURE — 84100 ASSAY OF PHOSPHORUS: CPT | Performed by: STUDENT IN AN ORGANIZED HEALTH CARE EDUCATION/TRAINING PROGRAM

## 2023-05-11 PROCEDURE — 80053 COMPREHEN METABOLIC PANEL: CPT | Performed by: STUDENT IN AN ORGANIZED HEALTH CARE EDUCATION/TRAINING PROGRAM

## 2023-05-11 PROCEDURE — 25010000002 MAGNESIUM SULFATE IN D5W 1G/100ML (PREMIX) 1-5 GM/100ML-% SOLUTION: Performed by: FAMILY MEDICINE

## 2023-05-11 PROCEDURE — C9803 HOPD COVID-19 SPEC COLLECT: HCPCS | Performed by: STUDENT IN AN ORGANIZED HEALTH CARE EDUCATION/TRAINING PROGRAM

## 2023-05-11 PROCEDURE — 85025 COMPLETE CBC W/AUTO DIFF WBC: CPT | Performed by: STUDENT IN AN ORGANIZED HEALTH CARE EDUCATION/TRAINING PROGRAM

## 2023-05-11 PROCEDURE — 87798 DETECT AGENT NOS DNA AMP: CPT

## 2023-05-11 PROCEDURE — 63710000001 DIPHENHYDRAMINE PER 50 MG: Performed by: STUDENT IN AN ORGANIZED HEALTH CARE EDUCATION/TRAINING PROGRAM

## 2023-05-11 PROCEDURE — 87581 M.PNEUMON DNA AMP PROBE: CPT

## 2023-05-11 PROCEDURE — 63710000001 REVEFENACIN 175 MCG/3ML SOLUTION: Performed by: STUDENT IN AN ORGANIZED HEALTH CARE EDUCATION/TRAINING PROGRAM

## 2023-05-11 PROCEDURE — 25010000002 HEPARIN (PORCINE) PER 1000 UNITS: Performed by: STUDENT IN AN ORGANIZED HEALTH CARE EDUCATION/TRAINING PROGRAM

## 2023-05-11 PROCEDURE — 87804 INFLUENZA ASSAY W/OPTIC: CPT | Performed by: STUDENT IN AN ORGANIZED HEALTH CARE EDUCATION/TRAINING PROGRAM

## 2023-05-11 PROCEDURE — 87633 RESP VIRUS 12-25 TARGETS: CPT

## 2023-05-11 PROCEDURE — 94640 AIRWAY INHALATION TREATMENT: CPT

## 2023-05-11 PROCEDURE — 87486 CHLMYD PNEUM DNA AMP PROBE: CPT | Performed by: STUDENT IN AN ORGANIZED HEALTH CARE EDUCATION/TRAINING PROGRAM

## 2023-05-11 PROCEDURE — 36415 COLL VENOUS BLD VENIPUNCTURE: CPT | Performed by: STUDENT IN AN ORGANIZED HEALTH CARE EDUCATION/TRAINING PROGRAM

## 2023-05-11 PROCEDURE — 87449 NOS EACH ORGANISM AG IA: CPT | Performed by: STUDENT IN AN ORGANIZED HEALTH CARE EDUCATION/TRAINING PROGRAM

## 2023-05-11 PROCEDURE — 63710000001 INSULIN LISPRO (HUMAN) PER 5 UNITS: Performed by: FAMILY MEDICINE

## 2023-05-11 PROCEDURE — 94761 N-INVAS EAR/PLS OXIMETRY MLT: CPT

## 2023-05-11 PROCEDURE — 83036 HEMOGLOBIN GLYCOSYLATED A1C: CPT | Performed by: FAMILY MEDICINE

## 2023-05-11 PROCEDURE — 25010000002 AZITHROMYCIN PER 500 MG: Performed by: STUDENT IN AN ORGANIZED HEALTH CARE EDUCATION/TRAINING PROGRAM

## 2023-05-11 PROCEDURE — 82948 REAGENT STRIP/BLOOD GLUCOSE: CPT

## 2023-05-11 PROCEDURE — 94664 DEMO&/EVAL PT USE INHALER: CPT

## 2023-05-11 PROCEDURE — 63710000001 PREDNISONE PER 1 MG: Performed by: STUDENT IN AN ORGANIZED HEALTH CARE EDUCATION/TRAINING PROGRAM

## 2023-05-11 PROCEDURE — 87040 BLOOD CULTURE FOR BACTERIA: CPT | Performed by: STUDENT IN AN ORGANIZED HEALTH CARE EDUCATION/TRAINING PROGRAM

## 2023-05-11 PROCEDURE — U0004 COV-19 TEST NON-CDC HGH THRU: HCPCS | Performed by: STUDENT IN AN ORGANIZED HEALTH CARE EDUCATION/TRAINING PROGRAM

## 2023-05-11 PROCEDURE — 99223 1ST HOSP IP/OBS HIGH 75: CPT | Performed by: STUDENT IN AN ORGANIZED HEALTH CARE EDUCATION/TRAINING PROGRAM

## 2023-05-11 PROCEDURE — 25010000002 CEFTRIAXONE PER 250 MG: Performed by: STUDENT IN AN ORGANIZED HEALTH CARE EDUCATION/TRAINING PROGRAM

## 2023-05-11 PROCEDURE — 0 POTASSIUM CHLORIDE 10 MEQ/100ML SOLUTION: Performed by: STUDENT IN AN ORGANIZED HEALTH CARE EDUCATION/TRAINING PROGRAM

## 2023-05-11 PROCEDURE — 87899 AGENT NOS ASSAY W/OPTIC: CPT | Performed by: STUDENT IN AN ORGANIZED HEALTH CARE EDUCATION/TRAINING PROGRAM

## 2023-05-11 PROCEDURE — 25010000002 MAGNESIUM SULFATE 2 GM/50ML SOLUTION: Performed by: STUDENT IN AN ORGANIZED HEALTH CARE EDUCATION/TRAINING PROGRAM

## 2023-05-11 RX ORDER — MAGNESIUM SULFATE 1 G/100ML
1 INJECTION INTRAVENOUS
Status: COMPLETED | OUTPATIENT
Start: 2023-05-11 | End: 2023-05-11

## 2023-05-11 RX ORDER — DEXTROSE MONOHYDRATE 25 G/50ML
25 INJECTION, SOLUTION INTRAVENOUS
Status: DISCONTINUED | OUTPATIENT
Start: 2023-05-11 | End: 2023-05-12 | Stop reason: HOSPADM

## 2023-05-11 RX ORDER — BUDESONIDE 0.5 MG/2ML
0.5 INHALANT ORAL
Status: DISCONTINUED | OUTPATIENT
Start: 2023-05-11 | End: 2023-05-11 | Stop reason: ALTCHOICE

## 2023-05-11 RX ORDER — PREDNISONE 20 MG/1
40 TABLET ORAL DAILY
Status: DISCONTINUED | OUTPATIENT
Start: 2023-05-11 | End: 2023-05-12 | Stop reason: HOSPADM

## 2023-05-11 RX ORDER — LEVALBUTEROL INHALATION SOLUTION 1.25 MG/3ML
1.25 SOLUTION RESPIRATORY (INHALATION) EVERY 4 HOURS PRN
Status: DISCONTINUED | OUTPATIENT
Start: 2023-05-11 | End: 2023-05-12 | Stop reason: HOSPADM

## 2023-05-11 RX ORDER — DULOXETIN HYDROCHLORIDE 30 MG/1
120 CAPSULE, DELAYED RELEASE ORAL DAILY
Status: DISCONTINUED | OUTPATIENT
Start: 2023-05-11 | End: 2023-05-12 | Stop reason: HOSPADM

## 2023-05-11 RX ORDER — NITROGLYCERIN 0.4 MG/1
0.4 TABLET SUBLINGUAL
Status: DISCONTINUED | OUTPATIENT
Start: 2023-05-11 | End: 2023-05-12 | Stop reason: HOSPADM

## 2023-05-11 RX ORDER — POTASSIUM CHLORIDE 7.45 MG/ML
10 INJECTION INTRAVENOUS
Status: COMPLETED | OUTPATIENT
Start: 2023-05-11 | End: 2023-05-11

## 2023-05-11 RX ORDER — NICOTINE POLACRILEX 4 MG
15 LOZENGE BUCCAL
Status: DISCONTINUED | OUTPATIENT
Start: 2023-05-11 | End: 2023-05-12 | Stop reason: HOSPADM

## 2023-05-11 RX ORDER — CETIRIZINE HYDROCHLORIDE 10 MG/1
10 TABLET ORAL DAILY
Status: DISCONTINUED | OUTPATIENT
Start: 2023-05-11 | End: 2023-05-12 | Stop reason: HOSPADM

## 2023-05-11 RX ORDER — HEPARIN SODIUM 5000 [USP'U]/ML
5000 INJECTION, SOLUTION INTRAVENOUS; SUBCUTANEOUS EVERY 8 HOURS SCHEDULED
Status: DISCONTINUED | OUTPATIENT
Start: 2023-05-11 | End: 2023-05-12 | Stop reason: HOSPADM

## 2023-05-11 RX ORDER — ACETAMINOPHEN 325 MG/1
650 TABLET ORAL EVERY 4 HOURS PRN
Status: DISCONTINUED | OUTPATIENT
Start: 2023-05-11 | End: 2023-05-12 | Stop reason: HOSPADM

## 2023-05-11 RX ORDER — BUDESONIDE AND FORMOTEROL FUMARATE DIHYDRATE 160; 4.5 UG/1; UG/1
2 AEROSOL RESPIRATORY (INHALATION)
Status: DISCONTINUED | OUTPATIENT
Start: 2023-05-11 | End: 2023-05-12 | Stop reason: HOSPADM

## 2023-05-11 RX ORDER — DIPHENHYDRAMINE HCL 25 MG
25 CAPSULE ORAL DAILY
Status: DISCONTINUED | OUTPATIENT
Start: 2023-05-11 | End: 2023-05-12 | Stop reason: HOSPADM

## 2023-05-11 RX ORDER — FLUTICASONE PROPIONATE 50 MCG
2 SPRAY, SUSPENSION (ML) NASAL DAILY
Status: DISCONTINUED | OUTPATIENT
Start: 2023-05-11 | End: 2023-05-12 | Stop reason: HOSPADM

## 2023-05-11 RX ORDER — POTASSIUM CHLORIDE 750 MG/1
20 CAPSULE, EXTENDED RELEASE ORAL
Status: COMPLETED | OUTPATIENT
Start: 2023-05-11 | End: 2023-05-11

## 2023-05-11 RX ORDER — AZITHROMYCIN 250 MG/1
500 TABLET, FILM COATED ORAL
Status: DISCONTINUED | OUTPATIENT
Start: 2023-05-12 | End: 2023-05-12 | Stop reason: HOSPADM

## 2023-05-11 RX ORDER — MONTELUKAST SODIUM 10 MG/1
10 TABLET ORAL DAILY
Status: DISCONTINUED | OUTPATIENT
Start: 2023-05-11 | End: 2023-05-12 | Stop reason: HOSPADM

## 2023-05-11 RX ORDER — LEVALBUTEROL INHALATION SOLUTION 1.25 MG/3ML
1.25 SOLUTION RESPIRATORY (INHALATION) EVERY 6 HOURS PRN
Status: DISCONTINUED | OUTPATIENT
Start: 2023-05-11 | End: 2023-05-11

## 2023-05-11 RX ORDER — LEVOTHYROXINE SODIUM 112 UG/1
112 TABLET ORAL
Status: DISCONTINUED | OUTPATIENT
Start: 2023-05-11 | End: 2023-05-12 | Stop reason: HOSPADM

## 2023-05-11 RX ORDER — METOPROLOL SUCCINATE 25 MG/1
25 TABLET, EXTENDED RELEASE ORAL DAILY
Status: DISCONTINUED | OUTPATIENT
Start: 2023-05-11 | End: 2023-05-12 | Stop reason: HOSPADM

## 2023-05-11 RX ORDER — PANTOPRAZOLE SODIUM 40 MG/1
40 TABLET, DELAYED RELEASE ORAL
Status: DISCONTINUED | OUTPATIENT
Start: 2023-05-11 | End: 2023-05-12 | Stop reason: HOSPADM

## 2023-05-11 RX ORDER — ATORVASTATIN CALCIUM 10 MG/1
10 TABLET, FILM COATED ORAL NIGHTLY
Status: DISCONTINUED | OUTPATIENT
Start: 2023-05-11 | End: 2023-05-12 | Stop reason: HOSPADM

## 2023-05-11 RX ORDER — CEFTRIAXONE SODIUM 1 G/50ML
1 INJECTION, SOLUTION INTRAVENOUS EVERY 24 HOURS
Status: DISCONTINUED | OUTPATIENT
Start: 2023-05-11 | End: 2023-05-12 | Stop reason: HOSPADM

## 2023-05-11 RX ORDER — LEVALBUTEROL INHALATION SOLUTION 1.25 MG/3ML
1.25 SOLUTION RESPIRATORY (INHALATION)
Status: DISCONTINUED | OUTPATIENT
Start: 2023-05-11 | End: 2023-05-12 | Stop reason: HOSPADM

## 2023-05-11 RX ORDER — SODIUM CHLORIDE 0.9 % (FLUSH) 0.9 %
10 SYRINGE (ML) INJECTION AS NEEDED
Status: DISCONTINUED | OUTPATIENT
Start: 2023-05-11 | End: 2023-05-12 | Stop reason: HOSPADM

## 2023-05-11 RX ORDER — SODIUM CHLORIDE 0.9 % (FLUSH) 0.9 %
10 SYRINGE (ML) INJECTION EVERY 12 HOURS SCHEDULED
Status: DISCONTINUED | OUTPATIENT
Start: 2023-05-11 | End: 2023-05-12 | Stop reason: HOSPADM

## 2023-05-11 RX ORDER — ARFORMOTEROL TARTRATE 15 UG/2ML
15 SOLUTION RESPIRATORY (INHALATION)
Status: DISCONTINUED | OUTPATIENT
Start: 2023-05-11 | End: 2023-05-11 | Stop reason: ALTCHOICE

## 2023-05-11 RX ORDER — INSULIN LISPRO 100 [IU]/ML
2-7 INJECTION, SOLUTION INTRAVENOUS; SUBCUTANEOUS
Status: DISCONTINUED | OUTPATIENT
Start: 2023-05-11 | End: 2023-05-12 | Stop reason: HOSPADM

## 2023-05-11 RX ORDER — MAGNESIUM SULFATE HEPTAHYDRATE 40 MG/ML
2 INJECTION, SOLUTION INTRAVENOUS ONCE
Status: COMPLETED | OUTPATIENT
Start: 2023-05-11 | End: 2023-05-11

## 2023-05-11 RX ORDER — SODIUM CHLORIDE 9 MG/ML
40 INJECTION, SOLUTION INTRAVENOUS AS NEEDED
Status: DISCONTINUED | OUTPATIENT
Start: 2023-05-11 | End: 2023-05-12 | Stop reason: HOSPADM

## 2023-05-11 RX ORDER — BUSPIRONE HYDROCHLORIDE 10 MG/1
10 TABLET ORAL 3 TIMES DAILY
Status: DISCONTINUED | OUTPATIENT
Start: 2023-05-11 | End: 2023-05-12 | Stop reason: HOSPADM

## 2023-05-11 RX ADMIN — PANTOPRAZOLE SODIUM 40 MG: 40 TABLET, DELAYED RELEASE ORAL at 06:26

## 2023-05-11 RX ADMIN — Medication 10 ML: at 09:46

## 2023-05-11 RX ADMIN — HEPARIN SODIUM 5000 UNITS: 5000 INJECTION INTRAVENOUS; SUBCUTANEOUS at 13:41

## 2023-05-11 RX ADMIN — HEPARIN SODIUM 5000 UNITS: 5000 INJECTION INTRAVENOUS; SUBCUTANEOUS at 23:31

## 2023-05-11 RX ADMIN — LEVALBUTEROL HYDROCHLORIDE 1.25 MG: 1.25 SOLUTION RESPIRATORY (INHALATION) at 14:09

## 2023-05-11 RX ADMIN — REVEFENACIN 175 MCG: 175 SOLUTION RESPIRATORY (INHALATION) at 10:19

## 2023-05-11 RX ADMIN — Medication 10 ML: at 03:10

## 2023-05-11 RX ADMIN — DIPHENHYDRAMINE HYDROCHLORIDE 25 MG: 25 CAPSULE ORAL at 09:45

## 2023-05-11 RX ADMIN — DULOXETINE HYDROCHLORIDE 120 MG: 30 CAPSULE, DELAYED RELEASE ORAL at 09:45

## 2023-05-11 RX ADMIN — LEVOTHYROXINE SODIUM 112 MCG: 0.11 TABLET ORAL at 06:44

## 2023-05-11 RX ADMIN — BUSPIRONE HYDROCHLORIDE 10 MG: 10 TABLET ORAL at 23:31

## 2023-05-11 RX ADMIN — PREDNISONE 40 MG: 20 TABLET ORAL at 09:45

## 2023-05-11 RX ADMIN — AZITHROMYCIN 500 MG: 500 INJECTION, POWDER, LYOPHILIZED, FOR SOLUTION INTRAVENOUS at 02:44

## 2023-05-11 RX ADMIN — INSULIN LISPRO 2 UNITS: 100 INJECTION, SOLUTION INTRAVENOUS; SUBCUTANEOUS at 12:40

## 2023-05-11 RX ADMIN — BUDESONIDE AND FORMOTEROL FUMARATE DIHYDRATE 2 PUFF: 160; 4.5 AEROSOL RESPIRATORY (INHALATION) at 10:19

## 2023-05-11 RX ADMIN — CEFTRIAXONE SODIUM 1 G: 1 INJECTION, SOLUTION INTRAVENOUS at 03:59

## 2023-05-11 RX ADMIN — ATORVASTATIN CALCIUM 10 MG: 10 TABLET, FILM COATED ORAL at 23:31

## 2023-05-11 RX ADMIN — MAGNESIUM SULFATE HEPTAHYDRATE 2 G: 2 INJECTION, SOLUTION INTRAVENOUS at 04:46

## 2023-05-11 RX ADMIN — POTASSIUM CHLORIDE 20 MEQ: 750 CAPSULE, EXTENDED RELEASE ORAL at 17:17

## 2023-05-11 RX ADMIN — Medication 10 ML: at 23:32

## 2023-05-11 RX ADMIN — MAGNESIUM SULFATE 1 G: 1 INJECTION INTRAVENOUS at 12:39

## 2023-05-11 RX ADMIN — LEVALBUTEROL HYDROCHLORIDE 1.25 MG: 1.25 SOLUTION RESPIRATORY (INHALATION) at 18:13

## 2023-05-11 RX ADMIN — MONTELUKAST 10 MG: 10 TABLET, FILM COATED ORAL at 09:45

## 2023-05-11 RX ADMIN — FLUTICASONE PROPIONATE 2 SPRAY: 50 SPRAY, METERED NASAL at 09:46

## 2023-05-11 RX ADMIN — MAGNESIUM SULFATE 1 G: 1 INJECTION INTRAVENOUS at 13:41

## 2023-05-11 RX ADMIN — BUSPIRONE HYDROCHLORIDE 10 MG: 10 TABLET ORAL at 16:35

## 2023-05-11 RX ADMIN — MAGNESIUM SULFATE 1 G: 1 INJECTION INTRAVENOUS at 11:21

## 2023-05-11 RX ADMIN — INSULIN LISPRO 3 UNITS: 100 INJECTION, SOLUTION INTRAVENOUS; SUBCUTANEOUS at 17:17

## 2023-05-11 RX ADMIN — HEPARIN SODIUM 5000 UNITS: 5000 INJECTION INTRAVENOUS; SUBCUTANEOUS at 06:26

## 2023-05-11 RX ADMIN — CETIRIZINE HYDROCHLORIDE 10 MG: 10 TABLET, FILM COATED ORAL at 09:45

## 2023-05-11 RX ADMIN — BUSPIRONE HYDROCHLORIDE 10 MG: 10 TABLET ORAL at 09:45

## 2023-05-11 RX ADMIN — BUDESONIDE AND FORMOTEROL FUMARATE DIHYDRATE 2 PUFF: 160; 4.5 AEROSOL RESPIRATORY (INHALATION) at 18:18

## 2023-05-11 RX ADMIN — METOPROLOL SUCCINATE 25 MG: 25 TABLET, EXTENDED RELEASE ORAL at 09:45

## 2023-05-11 RX ADMIN — POTASSIUM CHLORIDE 10 MEQ: 7.46 INJECTION, SOLUTION INTRAVENOUS at 05:13

## 2023-05-11 RX ADMIN — POTASSIUM CHLORIDE 10 MEQ: 7.46 INJECTION, SOLUTION INTRAVENOUS at 06:22

## 2023-05-11 RX ADMIN — POTASSIUM CHLORIDE 20 MEQ: 750 CAPSULE, EXTENDED RELEASE ORAL at 11:21

## 2023-05-11 NOTE — PLAN OF CARE
Problem: Adult Inpatient Plan of Care  Goal: Plan of Care Review  Outcome: Ongoing, Progressing  Flowsheets (Taken 5/11/2023 1605)  Progress: no change  Plan of Care Reviewed With: patient  Outcome Evaluation: PT IS DECREASED TO 3 LITERS OF OXYGEN. PT HAS HAD SOME BLOODY NOSE. ADDED HUMIFICATION TO OXYGEN TO HELP WITH DRY NOSE. COVID CAME BACK NEGATIVE. PT STATES SHE FEELS BETTER.  Goal: Patient-Specific Goal (Individualized)  Outcome: Ongoing, Progressing  Goal: Absence of Hospital-Acquired Illness or Injury  Outcome: Ongoing, Progressing  Intervention: Identify and Manage Fall Risk  Recent Flowsheet Documentation  Taken 5/11/2023 0951 by Wagner Gordon, RN  Safety Promotion/Fall Prevention: safety round/check completed  Taken 5/11/2023 0755 by Wagner Gordon, RN  Safety Promotion/Fall Prevention: safety round/check completed  Goal: Optimal Comfort and Wellbeing  Outcome: Ongoing, Progressing  Goal: Readiness for Transition of Care  Outcome: Ongoing, Progressing     Problem: COPD (Chronic Obstructive Pulmonary Disease) Comorbidity  Goal: Maintenance of COPD Symptom Control  Outcome: Ongoing, Progressing     Problem: Gas Exchange Impaired  Goal: Optimal Gas Exchange  Outcome: Ongoing, Progressing     Problem: Infection  Goal: Absence of Infection Signs and Symptoms  Outcome: Ongoing, Progressing   Goal Outcome Evaluation:  Plan of Care Reviewed With: patient        Progress: no change  Outcome Evaluation: PT IS DECREASED TO 3 LITERS OF OXYGEN. PT HAS HAD SOME BLOODY NOSE. ADDED HUMIFICATION TO OXYGEN TO HELP WITH DRY NOSE. COVID CAME BACK NEGATIVE. PT STATES SHE FEELS BETTER.

## 2023-05-11 NOTE — H&P
[facility]   Patient Name: Anna Solorio  : 1965  MRN: [id number]  Primary Care Physician:  Moris Dia MD  Date of admission: 5/10/2023    Subjective   Subjective     Chief Complaint: Shortness of breath    HPI:    Anna Solorio is a 57 y.o. female with a past medical history of COPD, anxiety, hypothyroidism, depression presented to the ED with complaints of shortness of breath that has started 2 days ago and has been progressively worsening.  Patient has initially seen some improvement with nebulizer therapy at home but continues to worsen so she came to the ED.  Associated with mildly worsening cough than baseline, increased productive sputum.  Denies any fevers, chills, nausea, vomiting, abdominal pain, focal weakness, numbness, tingling, diarrhea, dysuria.    Upon arrival to the ED, vital signs Temperature 99, pulse 116, respiratory 20, blood pressure 159/108 on 4 L of nasal cannula saturating around 92%.  Labs showed normal troponin, normal proBNP, potassium 2.6, bicarb 32.8, calcium 8.5, magnesium 1, WBC elevated 19.05, hemoglobin 10.8, platelets 423.  Blood cultures pending.  Chest x-ray, CT chest with contrast was done which showed no evidence of PE.  Numerous new tiny nodular and groundglass opacities throughout the left lung concerning for multifocal infection.  Advised emphysematous changes.  Large hiatal hernia.  Started on antibiotic coverage with ceftriaxone and azithromycin.  Received methylprednisolone 1.5 mg IV in the ED.  Patient has been admitted for further evaluation and management of multifocal pneumonia on the left, possible COPD exacerbation.        Personal History     Past Medical History:   Diagnosis Date   • Allergic rhinitis    • Anemia    • Anxiety    • Depression    • Diverticulitis    • GERD (gastroesophageal reflux disease)    • Mood disorder    • Sleep apnea          Past Surgical History:   Procedure  Laterality Date   • APPENDECTOMY     • BREAST BIOPSY Bilateral     benign   • BREAST SURGERY      benign breast cyst   • COLONOSCOPY  2014   • ENDOSCOPY  2014   • GALLBLADDER SURGERY     • LUNG SURGERY  2015    Left lung nodule removed         Family History   Problem Relation Age of Onset   • Leukemia Mother    • Leukemia Father          Social History     Socioeconomic History   • Marital status: Single   Tobacco Use   • Smoking status: Former     Packs/day: 1.50     Years: 36.00     Pack years: 54.00     Types: Cigarettes     Start date:      Quit date: 2015     Years since quittin.9     Passive exposure: Past   • Smokeless tobacco: Never   Vaping Use   • Vaping Use: Never used   Substance and Sexual Activity   • Alcohol use: Yes     Comment: social   • Drug use: Never   • Sexual activity: Defer         Home Medications:  DULoxetine, Umeclidinium-Vilanterol, albuterol sulfate HFA, atorvastatin, busPIRone, diphenhydrAMINE, fluticasone, levocetirizine, levothyroxine, metoprolol succinate XL, montelukast, and omeprazole    Allergies:  No Known Allergies    Review of Systems     Objective   Objective     Vitals:   Temp:  [97.7 °F (36.5 °C)-99 °F (37.2 °C)] 97.7 °F (36.5 °C)  Heart Rate:  [101-116] 102  Resp:  [19-20] 20  BP: (131-159)/() 131/86  Flow (L/min):  [2.5-4] 4    Physical Exam    Constitutional: Awake, alert, no acute distress   Eyes: Pupils equal, sclerae anicteric, no conjunctival injection   HENT: NCAT, mucous membranes moist   Respiratory: Bilateral air entry present, mild diffuse wheezing noted bilaterally, nonlabored respirations    Cardiovascular: Regular, tachycardia, no murmurs, rubs, or gallops   Gastrointestinal: Positive bowel sounds, soft, nontender, nondistended   Musculoskeletal: No bilateral ankle edema, no clubbing or cyanosis to extremities   Psychiatric: Appropriate affect, cooperative   Neurologic: Oriented x 3, speech clear    Result Review     Result Review:  I have personally reviewed the results from the time of this admission to 5/11/2023 03:08 EDT and agree with these findings:  [x]  Laboratory  [x]  Microbiology  [x]  Radiology  [x]  EKG/Telemetry   []  Cardiology/Vascular   []  Pathology  [x]  Old records  []  Other:        Imaging Results (Last 24 Hours)     Procedure Component Value Units Date/Time    CT Chest With Contrast Diagnostic [[id number]] Collected: 05/10/23 2201     Updated: 05/10/23 2204    Narrative:      PROCEDURE: CT CHEST W CONTRAST DIAGNOSTIC     COMPARISON:  [facility], CR, XR CHEST 1 VW, 5/10/2023, 18:42.  [facility], CT, CT CHEST LOW DOSE CANCER SCREENING WO, 8/31/2022, 15:22.  [facility], CT, CT ABDOMEN PELVIS WO CONTRAST, 11/11/2022, 8:57.     INDICATIONS: Shortness of breath, DYSPNEA SINCE 02:00 THISMORNING.  HISTORY OF ASTHMA AND COPD     TECHNIQUE: After obtaining the patient's consent, CT images were obtained with non-ionic   intravenous contrast material.       PROTOCOL:   Pulmonary embolism imaging protocol performed      RADIATION:   DLP: 393.2mGy*cm    Automated exposure control was utilized to minimize radiation dose.   CONTRAST: 68 cc Isovue 370 I.V.     FINDINGS:   No pulmonary arterial filling defects are seen to suggest pulmonary emboli.  Main pulmonary artery   is nondilated.  Heart size is mildly enlarged.  No pericardial or pleural effusion. Numerous tiny   nodular and ground-glass opacities in left upper and left lower lobes are new compared to 8/31/2022   CT.  Right lung is clear.  Moderate to severe centrilobular and paraseptal emphysematous changes   are upper lobe predominant.  There is a large hiatal hernia.  Partially included solid organs of   the upper abdomen appear unremarkable for the phase of contrast enhancement.  No acute osseous   abnormality is identified.       Impression:         1. No evidence of pulmonary emboli.  2. Numerous new tiny  nodular and ground-glass opacities throughout the left lung, concerning for   multifocal infection.  3. Advanced emphysematous changes.  4. Large hiatal hernia.            ADEEL DYE MD         Electronically Signed and Approved By: ADEEL DYE MD on 5/10/2023 at 22:01                     XR Chest 1 View [[id number]] Collected: 05/10/23 1850     Updated: 05/10/23 1854    Narrative:      PROCEDURE: XR CHEST 1 VW     COMPARISON: [facility], CR, CHEST PA/AP & LAT 2V, 10/30/2019, 16:42.    [facility], CR, XR CHEST 2 VW, 4/03/2023, 14:06.     INDICATIONS: SOA Triage Protocol     FINDINGS:      Surgical suture lines are present in the left lung field.  There is a large hiatal hernia.  The   lungs are well-expanded. The heart and pulmonary vasculature are within normal limits. No pleural   effusions are identified. There are no active appearing infiltrates.     IMPRESSION: No active disease.     ELIAS DONOHUE MD         Electronically Signed and Approved By: ELIAS DONOHUE MD on 5/10/2023 at 18:50                            atorvastatin, 10 mg, Oral, Nightly  azithromycin, 500 mg, Intravenous, Q24H  budesonide-formoterol, 2 puff, Inhalation, BID - RT  busPIRone, 10 mg, Oral, TID  cefTRIAXone, 1 g, Intravenous, Q24H  cetirizine, 10 mg, Oral, Daily  diphenhydrAMINE, 25 mg, Oral, Daily  DULoxetine, 120 mg, Oral, Daily  fluticasone, 2 spray, Each Nare, Daily  heparin (porcine), 5,000 Units, Subcutaneous, Q8H  levothyroxine, 112 mcg, Oral, Q AM  magnesium sulfate, 2 g, Intravenous, Once  metoprolol succinate XL, 25 mg, Oral, Daily  montelukast, 10 mg, Oral, Daily  pantoprazole, 40 mg, Oral, Q AM  potassium chloride, 10 mEq, Intravenous, Q1H  predniSONE, 40 mg, Oral, Daily  revefenacin, 175 mcg, Nebulization, Daily - RT  sodium chloride, 10 mL, Intravenous, Q12H         Assessment & Plan   Assessment / Plan     Assessment/Plan:   Sepsis  Acute hypoxic respiratory  failure  Multifocal pneumonia on the left lung   Possible atypical pneumonia  COPD exacerbation  Hypokalemia  Hypomagnesemia  Hyperlipidemia  Anxiety  Hypothyroidism  Depression    Plan  Admit inpatient  Continue ceftriaxone and azithromycin  Follow-up blood cultures, respiratory viral panel, COVID-19, influenza testing, urine Legionella, urine strep  Symbicort  Revefenacin nebulizer therapy  Xopenex every 6 hours as needed  Bronchodilator protocol  Bronchopulmonary hygiene  Prednisone 40 mg p.o. daily for 4 days   Consider pulmonology consult in a.m. depending on the clinical course of the patient  Received 40 mEq p.o. and 20 mEq IV potassium, 2 g IV magnesium, monitor with a.m. labs and replete  Resumed other appropriate home medications as ordered    DVT prophylaxis:  Medical DVT prophylaxis orders are present.    CODE STATUS:    Level Of Support Discussed With: Patient  Code Status (Patient has no pulse and is not breathing): CPR (Attempt to Resuscitate)  Medical Interventions (Patient has pulse or is breathing): Full Support    Admission Status:  I believe this patient meets inpatient status.    Part of this note may be an electronic transcription/translation of spoken language to printed text using the Dragon Dictation System    Titi Rios MD

## 2023-05-11 NOTE — PROGRESS NOTES
Respiratory Therapist Broncho-Pulmonary Hygiene Progress Note      Patient Name:  Anna Solorio  YOB: 1965    Anna Solorio meets the qualification for Level 2 of the Bronco-Pulmonary Hygiene Protocol. This was based on my daily patient assessment and includes review of chest x-ray results, cough ability and quality, oxygenation, secretions or risk for secretion development and patient mobility.     Broncho-Pulmonary Hygiene Assessment:    Level of Movement: Actively changing positions-requires assistance  Disoriented/Follows Commands    Breath Sounds: Clear to slightly diminished    Cough: Strong, effective and/or frequent    Chest X-Ray: Possible signs of consolidation and/or atelectasis or clear.     Sputum Productions: None or small amount of thin or watery secretions with effective cough    History and Physical: New onset of bronchitis or existing chronic pulmonary conditions.  **(not in an exacerbation)    SpO2 to Oxygen Need: greater than 92% on room air or  less than 3L nasal canula    Current SpO2 is: 93 on nasal cannula 2.5l      Based on this information, I have completed the following interventions: Aerobika with bronchodialtor medication or TID      Electronically signed by Fransisco Peraza CRT, 05/11/23, 1:57 AM EDT.

## 2023-05-11 NOTE — SIGNIFICANT NOTE
05/11/23 1115   Coping/Psychosocial   Observed Emotional State calm;cooperative   Verbalized Emotional State relief;hopefulness   Trust Relationship/Rapport empathic listening provided   Involvement in Care interacting with patient   Additional Documentation Spiritual Care (Group)   Spiritual Care   Use of Spiritual Resources non-Mormonism use of spiritual care   Spiritual Care Source  initiative   Spiritual Care Follow-Up follow-up, none required as presently assessed   Response to Spiritual Care engaged in conversation;receptive of support;thanks expressed   Spiritual Care Interventions supportive conversation provided   Spiritual Care Visit Type initial   Receptivity to Spiritual Care visit welcomed

## 2023-05-11 NOTE — ED PROVIDER NOTES
Time: 9:03 PM EDT  Date of encounter:  5/10/2023  Independent Historian/Clinical History and Information was obtained by:   Patient  Chief Complaint: SOB    History is limited by: N/A    History of Present Illness:      Patient is a 57 y.o. year old female who presents to the emergency department for evaluation of SOB with acute onset at 0200. Pt admits to a history of COPD. Pt notes that she used a breathing treatment twice, which temporarily alleviated symptoms but led to worsening SOB. Pt denies being on oxygen at home. Pt also admits to a sore throat that started this morning.          History provided by:  Patient   used: No    Shortness of Breath  Onset quality:  Gradual  Progression:  Worsening  Chronicity:  Chronic  Relieved by: Breathing treatment (temporarily)  Associated symptoms: sore throat    Associated symptoms: no abdominal pain, no chest pain, no cough, no fever, no headaches and no vomiting        Patient Care Team  Primary Care Provider: Moris Dia MD    Past Medical History:     No Known Allergies  Past Medical History:   Diagnosis Date   • Allergic rhinitis    • Anemia    • Anxiety    • Depression    • Diverticulitis    • GERD (gastroesophageal reflux disease)    • Mood disorder    • Sleep apnea      Past Surgical History:   Procedure Laterality Date   • APPENDECTOMY     • BREAST BIOPSY Bilateral     benign   • BREAST SURGERY      benign breast cyst   • COLONOSCOPY  2014 2013   • ENDOSCOPY  2014 2013   • GALLBLADDER SURGERY  2014   • LUNG SURGERY  2015    Left lung nodule removed     Family History   Problem Relation Age of Onset   • Leukemia Mother    • Leukemia Father        Home Medications:  Prior to Admission medications    Medication Sig Start Date End Date Taking? Authorizing Provider   albuterol sulfate  (90 Base) MCG/ACT inhaler INHALE TWO PUFFS BY MOUTH EVERY 4 HOURS. 8/9/21   Provider, MD Lizbeth   atorvastatin (LIPITOR) 10 MG tablet  atorvastatin 10 mg oral tablet take 1 tablet (10 mg) by oral route once daily at bedtime   Active    Lizbeth Chicas MD   Brexpiprazole (Rexulti) 0.5 MG tablet Rexulti 0.5 mg oral tablet take 1 tablet (0.5 mg) by oral route once daily   Active  Patient not taking: Reported on 4/3/2023    Lizbeth Chicas MD   busPIRone (BUSPAR) 10 MG tablet buspirone 10 mg oral tablet take 1 tablet (10 mg) by oral route 3 times per day   Active    Lizbeth Chicas MD   cetirizine (zyrTEC) 10 MG tablet Zyrtec 10 mg oral tablet take 1 tablet (10 mg) by oral route once daily   Active  Patient not taking: Reported on 4/3/2023    Lizbeth Chicas MD   diphenhydrAMINE (BENADRYL) 25 mg capsule     Lizbeth Chicas MD   DULoxetine (CYMBALTA) 30 MG capsule Take 90 mg by mouth Daily.  Patient not taking: Reported on 4/3/2023 9/3/21   Lizbeth Chicas MD   DULoxetine (CYMBALTA) 60 MG capsule Cymbalta 60 mg oral capsule,delayed release(DR/EC) take 1.5 capsules by oral route daily   Active    Lizbeth Chicas MD   esomeprazole (nexIUM) 40 MG capsule     Lizbeth Chicas MD   fluticasone (FLONASE) 50 MCG/ACT nasal spray 2 sprays by Each Nare route Daily. 9/3/21   Lizbeth Chicas MD   HYDROcodone-acetaminophen (Norco) 5-325 MG per tablet Take 1 tablet by mouth Every 6 (Six) Hours As Needed for Moderate Pain .  Patient not taking: Reported on 4/3/2023 9/7/21   Rocky Varela MD   levocetirizine (XYZAL) 5 MG tablet Take 1 tablet by mouth Daily. 8/16/21   Lizbeth Chicas MD   levothyroxine (SYNTHROID, LEVOTHROID) 112 MCG tablet levothyroxine 112 mcg oral tablet take 1 tablet (112 mcg) by oral route once daily   Active  Patient not taking: Reported on 4/3/2023    Lizbeth Chicas MD   metoprolol succinate XL (TOPROL-XL) 25 MG 24 hr tablet metoprolol succinate 25 mg oral tablet extended release 24 hr take 1 tablet (25 mg) by oral route once daily   Active    Lizbeth Chicas MD  "  montelukast (SINGULAIR) 10 MG tablet Take 1 tablet by mouth Daily. 9/3/21   Provider, MD Lizbeth   omeprazole (priLOSEC) 40 MG capsule Take 1 capsule by mouth Daily. 9/3/21   Provider, MD Lizbeth   Umeclidinium-Vilanterol (ANORO ELLIPTA) 62.5-25 MCG/ACT aerosol powder  inhaler Inhale 1 puff Daily. 23   Gera Colon MD        Social History:   Social History     Tobacco Use   • Smoking status: Former     Packs/day: 1.50     Years: 36.00     Pack years: 54.00     Types: Cigarettes     Start date:      Quit date: 2015     Years since quittin.9     Passive exposure: Past   • Smokeless tobacco: Never   Vaping Use   • Vaping Use: Never used   Substance Use Topics   • Alcohol use: Yes     Comment: social   • Drug use: Never         Review of Systems:  Review of Systems   Constitutional: Negative for chills and fever.   HENT: Positive for sore throat. Negative for congestion and rhinorrhea.    Eyes: Negative for pain and visual disturbance.   Respiratory: Positive for shortness of breath. Negative for apnea, cough and chest tightness.    Cardiovascular: Negative for chest pain and palpitations.   Gastrointestinal: Negative for abdominal pain, diarrhea, nausea and vomiting.   Genitourinary: Negative for difficulty urinating and dysuria.   Musculoskeletal: Negative for joint swelling and myalgias.   Skin: Negative for color change.   Neurological: Negative for seizures and headaches.   Psychiatric/Behavioral: Negative.    All other systems reviewed and are negative.       Physical Exam:  /86 (BP Location: Right arm, Patient Position: Lying)   Pulse 102   Temp 97.7 °F (36.5 °C) (Oral)   Resp 20   Ht 154.9 cm (61\")   Wt 84.4 kg (186 lb 1.1 oz)   SpO2 92%   BMI 35.16 kg/m²     Physical Exam  Vitals and nursing note reviewed.   Constitutional:       General: She is not in acute distress.     Appearance: Normal appearance. She is not toxic-appearing.   HENT:      Head: Normocephalic and " atraumatic.      Jaw: There is normal jaw occlusion.   Eyes:      General: Lids are normal.      Extraocular Movements: Extraocular movements intact.      Conjunctiva/sclera: Conjunctivae normal.      Pupils: Pupils are equal, round, and reactive to light.   Cardiovascular:      Rate and Rhythm: Normal rate and regular rhythm.      Pulses: Normal pulses.      Heart sounds: Normal heart sounds.   Pulmonary:      Effort: Pulmonary effort is normal. No respiratory distress.      Breath sounds: Examination of the right-upper field reveals wheezing. Examination of the left-upper field reveals wheezing. Examination of the right-middle field reveals wheezing. Examination of the left-middle field reveals wheezing. Examination of the right-lower field reveals wheezing. Examination of the left-lower field reveals wheezing. Wheezing (mild) present. No rhonchi.   Abdominal:      General: Abdomen is flat.      Palpations: Abdomen is soft.      Tenderness: There is no abdominal tenderness. There is no guarding or rebound.   Musculoskeletal:         General: Normal range of motion.      Cervical back: Normal range of motion and neck supple.      Right lower leg: No edema.      Left lower leg: No edema.   Skin:     General: Skin is warm and dry.   Neurological:      Mental Status: She is alert and oriented to person, place, and time. Mental status is at baseline.   Psychiatric:         Mood and Affect: Mood normal.                  Procedures:  Procedures      Medical Decision Making:      Comorbidities that affect care:    GERD, COPD    External Notes reviewed:    Previous Clinic Note: Patient was evaluated by her pcp for COPD      The following orders were placed and all results were independently analyzed by me:  Orders Placed This Encounter   Procedures   • Blood Culture - Blood,   • Blood Culture - Blood,   • Respiratory Panel, PCR (WITHOUT COVID) - Swab, Nasopharynx   • Influenza Antigen, Rapid - Swab, Nasopharynx   •  COVID-19,APTIMA PANTHER(AVA),[facility]/ [facility]UIL[facility], NP/OP SWAB IN UTM/VTM/SALINE TRANSPORT MEDIA,24 HR TAT - Swab, Nasal Cavity   • Legionella Antigen, Urine - Urine, Urine, Clean Catch   • S. Pneumo Ag Urine or CSF - Urine, Urine, Clean Catch   • XR Chest 1 View   • CT Chest With Contrast Diagnostic   • [place] Draw   • Comprehensive Metabolic Panel   • BNP   • Single High Sensitivity Troponin T   • CBC Auto Differential   • Procalcitonin   • Magnesium   • Phosphorus   • Potassium   • Magnesium   • High Sensitivity Troponin T   • Blood Gas, Arterial -With Co-Ox Panel: Yes   • CBC Auto Differential   • Comprehensive Metabolic Panel   • Magnesium   • Phosphorus   • Diet: Regular/House Diet; Texture: Regular Texture (IDDSI 7); Fluid Consistency: Thin (IDDSI 0)   • Undress & Gown   • Continuous Pulse Oximetry   • Vital Signs   • Vital Signs   • Intake & Output   • Weigh Patient   • Oral Care   • Telemetry - Maintain IV Access   • Continuous Cardiac Monitoring   • Telemetry - Pulse Oximetry   • Notify Provider (With Default Parameters)   • Code Status and Medical Interventions:   • Inpatient Hospitalist Consult   • Oxygen Therapy- Nasal Cannula; Titrate for SPO2: 90% - 95%   • Oxygen Therapy- Nasal Cannula; Titrate for SPO2: 90% - 95%   • RT to Initiate Bronchopulmonary Hygiene Protocol   • RT to Initiate Bronchodilator Protocol   • ECG 12 Lead ED Triage Standing Order; SOA   • ECG 12 Lead Chest Pain   • Insert Peripheral IV   • Insert Peripheral IV   • Inpatient Admission   • CBC & Differential   • Green Top (Gel)   • Lavender Top   • Gold Top - SST   • Light Blue Top       Medications Given in the Emergency Department:  Medications   sodium chloride 0.9 % flush 10 mL (has no administration in time range)   albuterol (PROVENTIL) nebulizer solution 0.083% 2.5 mg/3mL (7.5 mg Nebulization New Bag 5/10/23 2112)   atorvastatin (LIPITOR) tablet 10 mg (has no administration in time range)   busPIRone (BUSPAR) tablet 10 mg (has no  administration in time range)   diphenhydrAMINE (BENADRYL) capsule 25 mg (has no administration in time range)   DULoxetine (CYMBALTA) DR capsule 120 mg (has no administration in time range)   fluticasone (FLONASE) 50 MCG/ACT nasal spray 2 spray (has no administration in time range)   cetirizine (zyrTEC) tablet 10 mg (has no administration in time range)   levothyroxine (SYNTHROID, LEVOTHROID) tablet 112 mcg (has no administration in time range)   metoprolol succinate XL (TOPROL-XL) 24 hr tablet 25 mg (has no administration in time range)   montelukast (SINGULAIR) tablet 10 mg (has no administration in time range)   pantoprazole (PROTONIX) EC tablet 40 mg (has no administration in time range)   sodium chloride 0.9 % flush 10 mL (has no administration in time range)   sodium chloride 0.9 % flush 10 mL (10 mL Intravenous Given 5/11/23 0310)   sodium chloride 0.9 % infusion 40 mL (has no administration in time range)   heparin (porcine) 5000 UNIT/ML injection 5,000 Units (has no administration in time range)   nitroglycerin (NITROSTAT) SL tablet 0.4 mg (has no administration in time range)   revefenacin (YUPELRI) nebulizer solution 175 mcg (has no administration in time range)   levalbuterol (XOPENEX) nebulizer solution 1.25 mg (has no administration in time range)   AZITHROMYCIN 500 MG/250 ML 0.9% NS IVPB (vial-mate) (500 mg Intravenous New Bag 5/11/23 0244)   predniSONE (DELTASONE) tablet 40 mg (has no administration in time range)   acetaminophen (TYLENOL) tablet 650 mg (has no administration in time range)   budesonide-formoterol (SYMBICORT) 160-4.5 MCG/ACT inhaler 2 puff ( Inhalation Canceled Entry 5/11/23 4287)   potassium chloride 10 mEq in 100 mL IVPB (has no administration in time range)   cefTRIAXone (ROCEPHIN) IVPB 1 g (1 g Intravenous New Bag 5/11/23 4028)   methylPREDNISolone sodium succinate (SOLU-Medrol) injection 125 mg (125 mg Intravenous Given 5/10/23 2116)   potassium chloride (MICRO-K) CR capsule 40  mEq (40 mEq Oral Given 5/10/23 2300)   iopamidol (ISOVUE-370) 76 % injection 100 mL (100 mL Intravenous Given 5/10/23 2200)   magnesium sulfate 2g/50 mL (PREMIX) infusion (2 g Intravenous New Bag 5/11/23 0446)        ED Course:    ED Course as of 05/11/23 0453   Thu May 11, 2023   0450 EKG:    Rhythm: sinus  Rate: 118  Axis: normal  Intervals: normal  ST Segment: no elevations      Interpreted by me   [BN]      ED Course User Index  [BN] Annette Alonzo MD       Labs:    Lab Results (last 24 hours)     Procedure Component Value Units Date/Time    CBC & Differential [[id number]]  (Abnormal) Collected: 05/10/23 1831    Specimen: Blood Updated: 05/10/23 1841    Narrative:      The following orders were created for panel order CBC & Differential.  Procedure                               Abnormality         Status                     ---------                               -----------         ------                     CBC Auto Differential[[id number]]        Abnormal            Final result                 Please view results for these tests on the individual orders.    Comprehensive Metabolic Panel [[id number]]  (Abnormal) Collected: 05/10/23 1831    Specimen: Blood Updated: 05/10/23 1922     Glucose 173 mg/dL      BUN 7 mg/dL      Creatinine 0.67 mg/dL      Sodium 140 mmol/L      Potassium 2.6 mmol/L      Chloride 97 mmol/L      CO2 32.8 mmol/L      Calcium 8.5 mg/dL      Total Protein 7.4 g/dL      Albumin 4.1 g/dL      ALT (SGPT) 20 U/L      AST (SGOT) 18 U/L      Alkaline Phosphatase 108 U/L      Total Bilirubin 0.4 mg/dL      Globulin 3.3 gm/dL      A/G Ratio 1.2 g/dL      BUN/Creatinine Ratio 10.4     Anion Gap 10.2 mmol/L      eGFR 101.5 mL/min/1.73     Narrative:      GFR Normal >60  Chronic Kidney Disease <60  Kidney Failure <15      BNP [[id number]]  (Normal) Collected: 05/10/23 1831    Specimen: Blood Updated: 05/10/23 1909     proBNP 142.1 pg/mL     Narrative:      Among patients with dyspnea, NT-proBNP is  highly sensitive for the detection of acute congestive heart failure. In addition NT-proBNP of <300 pg/ml effectively rules out acute congestive heart failure with 99% negative predictive value.    Results may be falsely decreased if patient taking Biotin.      Single High Sensitivity Troponin T [[id number]]  (Normal) Collected: 05/10/23 1831    Specimen: Blood Updated: 05/10/23 1909     HS Troponin T 7 ng/L     Narrative:      High Sensitive Troponin T Reference Range:  <10.0 ng/L- Negative Female for AMI  <15.0 ng/L- Negative Male for AMI  >=10 - Abnormal Female indicating possible myocardial injury.  >=15 - Abnormal Male indicating possible myocardial injury.   Clinicians would have to utilize clinical acumen, EKG, Troponin, and serial changes to determine if it is an Acute Myocardial Infarction or myocardial injury due to an underlying chronic condition.         CBC Auto Differential [[id number]]  (Abnormal) Collected: 05/10/23 1831    Specimen: Blood Updated: 05/10/23 1841     WBC 19.05 10*3/mm3      RBC 3.92 10*6/mm3      Hemoglobin 10.8 g/dL      Hematocrit 32.7 %      MCV 83.4 fL      MCH 27.6 pg      MCHC 33.0 g/dL      RDW 14.1 %      RDW-SD 42.5 fl      MPV 9.3 fL      Platelets 423 10*3/mm3      Neutrophil % 89.6 %      Lymphocyte % 5.6 %      Monocyte % 3.3 %      Eosinophil % 0.6 %      Basophil % 0.3 %      Immature Grans % 0.6 %      Neutrophils, Absolute 17.07 10*3/mm3      Lymphocytes, Absolute 1.06 10*3/mm3      Monocytes, Absolute 0.62 10*3/mm3      Eosinophils, Absolute 0.12 10*3/mm3      Basophils, Absolute 0.06 10*3/mm3      Immature Grans, Absolute 0.12 10*3/mm3      nRBC 0.0 /100 WBC     Procalcitonin [[id number]]  (Normal) Collected: 05/10/23 1831    Specimen: Blood Updated: 05/10/23 2222     Procalcitonin 0.12 ng/mL     Narrative:      As a Marker for Sepsis (Non-Neonates):    1. <0.5 ng/mL represents a low risk of severe sepsis and/or septic shock.  2. >2 ng/mL represents a high risk of  "severe sepsis and/or septic shock.    As a Marker for Lower Respiratory Tract Infections that require antibiotic therapy:    PCT on Admission    Antibiotic Therapy       6-12 Hrs later    >0.5                Strongly Recommended  >0.25 - <0.5        Recommended  0.1 - 0.25          Discouraged              Remeasure/reassess PCT  <0.1                Strongly Discouraged     Remeasure/reassess PCT    As 28 day mortality risk marker: \"Change in Procalcitonin Result\" (>80% or <=80%) if Day 0 (or Day 1) and Day 4 values are available. Refer to http://www.[facility]-pct-calculator.com    Change in PCT <=80%  A decrease of PCT levels below or equal to 80% defines a positive change in PCT test result representing a higher risk for 28-day all-cause mortality of patients diagnosed with severe sepsis for septic shock.    Change in PCT >80%  A decrease of PCT levels of more than 80% defines a negative change in PCT result representing a lower risk for 28-day all-cause mortality of patients diagnosed with severe sepsis or septic shock.    This test is Prognostic not Diagnostic, if elevated correlate with clinical findings before administering antibiotic treatment.        Magnesium [[id number]]  (Abnormal) Collected: 05/10/23 1831    Specimen: Blood Updated: 05/10/23 2217     Magnesium 1.0 mg/dL     Phosphorus [[id number]]  (Normal) Collected: 05/10/23 1831    Specimen: Blood Updated: 05/10/23 2212     Phosphorus 2.9 mg/dL     Blood Culture - Blood, Hand, Left [[id number]] Collected: 05/11/23 0043    Specimen: Blood from Hand, Left Updated: 05/11/23 0104    Blood Culture - Blood, Hand, Left [[id number]] Collected: 05/11/23 0043    Specimen: Blood from Hand, Left Updated: 05/11/23 0104    Respiratory Panel, PCR (WITHOUT COVID) - Swab, Nasopharynx [[id number]] Collected: 05/11/23 0250    Specimen: Swab from Nasopharynx Updated: 05/11/23 0303    Influenza Antigen, Rapid - Swab, Nasopharynx [[id number]]  (Normal) Collected: 05/11/23 0250    " Specimen: Swab from Nasopharynx Updated: 05/11/23 0332     Influenza A Ag, EIA Negative     Influenza B Ag, EIA Negative    COVID-19,APTIMA PANTHER(AVA),[facility]UIL[facility], NP/OP SWAB IN UTM/VTM/SALINE TRANSPORT MEDIA,24 HR TAT - Swab, Nasal Cavity [[id number]] Collected: 05/11/23 0335    Specimen: Swab from Nasal Cavity Updated: 05/11/23 0343    Legionella Antigen, Urine - Urine, Urine, Clean Catch [[id number]] Collected: 05/11/23 0335    Specimen: Urine, Clean Catch Updated: 05/11/23 0343    S. Pneumo Ag Urine or CSF - Urine, Urine, Clean Catch [[id number]] Collected: 05/11/23 0335    Specimen: Urine, Clean Catch Updated: 05/11/23 0343           Imaging:    CT Chest With Contrast Diagnostic    Result Date: 5/10/2023  PROCEDURE: CT CHEST W CONTRAST DIAGNOSTIC  COMPARISON:  [facility], CR, XR CHEST 1 VW, 5/10/2023, 18:42.  [facility], CT, CT CHEST LOW DOSE CANCER SCREENING WO, 8/31/2022, 15:22.  [facility], CT, CT ABDOMEN PELVIS WO CONTRAST, 11/11/2022, 8:57.  INDICATIONS: Shortness of breath, DYSPNEA SINCE 02:00 THISMORNING.  HISTORY OF ASTHMA AND COPD  TECHNIQUE: After obtaining the patient's consent, CT images were obtained with non-ionic intravenous contrast material.   PROTOCOL:   Pulmonary embolism imaging protocol performed    RADIATION:   DLP: 393.2mGy*cm   Automated exposure control was utilized to minimize radiation dose. CONTRAST: 68 cc Isovue 370 I.V.  FINDINGS:  No pulmonary arterial filling defects are seen to suggest pulmonary emboli.  Main pulmonary artery is nondilated.  Heart size is mildly enlarged.  No pericardial or pleural effusion. Numerous tiny nodular and ground-glass opacities in left upper and left lower lobes are new compared to 8/31/2022 CT.  Right lung is clear.  Moderate to severe centrilobular and paraseptal emphysematous changes are upper lobe predominant.  There is a large hiatal hernia.  Partially included solid organs of the upper  abdomen appear unremarkable for the phase of contrast enhancement.  No acute osseous abnormality is identified.        1. No evidence of pulmonary emboli. 2. Numerous new tiny nodular and ground-glass opacities throughout the left lung, concerning for multifocal infection. 3. Advanced emphysematous changes. 4. Large hiatal hernia.     ADEEL DYE MD       Electronically Signed and Approved By: ADEEL DYE MD on 5/10/2023 at 22:01             XR Chest 1 View    Result Date: 5/10/2023  PROCEDURE: XR CHEST 1 VW  COMPARISON: [facility], CR, CHEST PA/AP & LAT 2V, 10/30/2019, 16:42.  [facility], CR, XR CHEST 2 VW, 4/03/2023, 14:06.  INDICATIONS: SOA Triage Protocol  FINDINGS:   Surgical suture lines are present in the left lung field.  There is a large hiatal hernia.  The lungs are well-expanded. The heart and pulmonary vasculature are within normal limits. No pleural effusions are identified. There are no active appearing infiltrates.  IMPRESSION: No active disease.  ELIAS DONOHUE MD       Electronically Signed and Approved By: ELIAS DONOHUE MD on 5/10/2023 at 18:50                 Differential Diagnosis and Discussion:    Dyspnea: Differential diagnosis includes but is not limited to metabolic acidosis, neurological disorders, psychogenic, asthma, pneumothorax, upper airway obstruction, COPD, pneumonia, noncardiogenic pulmonary edema, interstitial lung disease, anemia, congestive heart failure, and pulmonary embolism    All labs were reviewed and interpreted by me.  All X-rays impressions were independently interpreted by me.  EKG was interpreted by me.    The patient´s CBC that was reviewed and interpreted by me shows no abnormalities of critical concern. Of note, there is no anemia requiring a blood transfusion and the platelet count is acceptable.  The patient´s CMP that was reviewed and interpretted by me shows no abnormalities of critical concern.  Patient  does have a potassium of 2.6.  Patient was given potassium in the emergency department.  Magnesium is 1.0.  CT chest is negative for pulmonary embolism.  The patient´s liver enzymes are unremarkable. The patient´s renal function (creatinine) is preserved. The patient has a normal anion gap.     MDM     Decision making regarding discharge:    This 57-year-old female with a history of COPD experiencing a COPD exacerbation and hypoxia should be admitted to the hospital for several important reasons.    Management of acute exacerbation: COPD exacerbations can be severe and life-threatening, especially when accompanied by hypoxia. Hospital admission allows for immediate and intensive management of the exacerbation, including close monitoring of respiratory status, oxygenation levels, and vital signs. Healthcare professionals in the hospital setting are equipped to provide prompt intervention, such as supplemental oxygen therapy and bronchodilator medications, to alleviate symptoms and improve respiratory function.    Assessment of severity and determination of appropriate treatment: Hospitalization enables a comprehensive evaluation of the severity of the COPD exacerbation. This may involve pulmonary function testing, arterial blood gas analysis, chest imaging, and other diagnostic tests. These assessments help guide treatment decisions, including the use of specific medications, the need for non-invasive or invasive mechanical ventilation, or consideration of other interventions to optimize respiratory support.    Monitoring for complications: COPD exacerbations can lead to various complications, such as respiratory failure, pneumonia, or pneumothorax. Hospital admission facilitates continuous monitoring of the patient's respiratory status, oxygen saturation, and overall clinical condition, allowing for early detection and management of potential complications. Timely intervention can help prevent further deterioration  and improve outcomes.    Access to multidisciplinary care: Hospitalization ensures access to a multidisciplinary team of healthcare professionals, including pulmonologists, respiratory therapists, and specialized nursing staff. These experts collaborate to provide comprehensive care, individualized treatment plans, and ongoing assessment of the patient's response to therapy. Close coordination among team members optimizes the management of COPD exacerbation and reduces the risk of adverse outcomes.    Education and self-management: Hospital admission provides an opportunity for patient education and self-management strategies. Patients can receive guidance on medication usage, inhalation techniques, proper self-monitoring, and recognition of early warning signs of exacerbation. Education empowers patients to actively participate in managing their condition and reduces the likelihood of future exacerbations.    Coordination of discharge planning and follow-up care: Hospital admission allows for effective coordination of discharge planning and post-hospitalization care. This may involve arranging home oxygen therapy, pulmonary rehabilitation programs, outpatient follow-up appointments, and ensuring the patient's understanding of post-discharge care instructions. Proper discharge planning helps facilitate a smooth transition from the hospital setting to ongoing management in the community.    In summary, hospital admission for the 57-year-old female with a history of COPD experiencing a COPD exacerbation and hypoxia is essential for prompt management of the exacerbation, monitoring for complications, access to multidisciplinary care, patient education, and coordination of follow-up care. These measures aim to stabilize the patient's respiratory status, prevent complications, optimize treatment, and improve the overall management of COPD in a controlled and supportive environment.    Critical Care Note: Total Critical  Care time of 45 minutes. Total critical care time documented does not include time spent on separately billed procedures for services of nurses or physician assistants. I personally saw and examined the patient. I have reviewed all diagnostic interpretations and treatment plans as written. I was present for the key portions of any procedures performed and the inclusive time noted in any critical care statement. Critical care time includes patient management by me, time spent at the patients bedside,  time to review lab and imaging results, discussing patient care, documentation in the medical record, and time spent with family or caregiver.    Patient Care Considerations:          Consultants/Shared Management Plan:    I considered ordering antibiotics, however no bacterial focus of infection was found.    Social Determinants of Health:    Patient has presented with family members who are responsible, reliable and will ensure follow up care.      Disposition and Care Coordination:    Admit:   Through independent evaluation of the patient's history, physical, and imperical data, the patient meets criteria for observation/admission to the hospital.        Final diagnoses:   Shortness of breath        ED Disposition     ED Disposition   Decision to Admit    Condition   --    Comment   Level of Care: Remote Telemetry [26]   Diagnosis: Shortness of breath [786.05.ICD-9-CM]   Certification: I Certify That Inpatient Hospital Services Are Medically Necessary For Greater Than 2 Midnights               This medical record created using voice recognition software.        Documentation assistance provided by Patricio Arroyo, acting as scribe for Annette Alonzo MD. Information recorded by the scribe was done at my direction and has been verified and validated by me.       Patricio Arroyo  05/10/23 4752       Annette Alonzo MD  05/11/23 0581

## 2023-05-11 NOTE — ED NOTES
ATTEMPTED TO CALL REPORT TO FLOOR. NURSE NOT AVAILABLE TO TAKE REPORT AT THIS TIME. STATES SHE WILL CALL BACK

## 2023-05-11 NOTE — PLAN OF CARE
Goal Outcome Evaluation:  Plan of Care Reviewed With: patient           Outcome Evaluation: admitted from Ed. Vss. Flu negative, waiting for covid result.

## 2023-05-12 ENCOUNTER — READMISSION MANAGEMENT (OUTPATIENT)
Dept: CALL CENTER | Facility: HOSPITAL | Age: 58
End: 2023-05-12
Payer: COMMERCIAL

## 2023-05-12 VITALS
BODY MASS INDEX: 35.13 KG/M2 | OXYGEN SATURATION: 94 % | HEART RATE: 102 BPM | TEMPERATURE: 97.9 F | SYSTOLIC BLOOD PRESSURE: 146 MMHG | DIASTOLIC BLOOD PRESSURE: 86 MMHG | HEIGHT: 61 IN | WEIGHT: 186.07 LBS | RESPIRATION RATE: 18 BRPM

## 2023-05-12 PROBLEM — J44.1 COPD WITH ACUTE EXACERBATION: Status: ACTIVE | Noted: 2023-05-12

## 2023-05-12 LAB
ALBUMIN SERPL-MCNC: 3.6 G/DL (ref 3.5–5.2)
ANION GAP SERPL CALCULATED.3IONS-SCNC: 7 MMOL/L (ref 5–15)
BUN SERPL-MCNC: 12 MG/DL (ref 6–20)
BUN/CREAT SERPL: 18.8 (ref 7–25)
CALCIUM SPEC-SCNC: 8.7 MG/DL (ref 8.6–10.5)
CHLORIDE SERPL-SCNC: 102 MMOL/L (ref 98–107)
CO2 SERPL-SCNC: 30 MMOL/L (ref 22–29)
CREAT SERPL-MCNC: 0.64 MG/DL (ref 0.57–1)
DEPRECATED RDW RBC AUTO: 46.2 FL (ref 37–54)
EGFRCR SERPLBLD CKD-EPI 2021: 102.6 ML/MIN/1.73
ERYTHROCYTE [DISTWIDTH] IN BLOOD BY AUTOMATED COUNT: 14.4 % (ref 12.3–15.4)
GLUCOSE BLDC GLUCOMTR-MCNC: 113 MG/DL (ref 70–99)
GLUCOSE BLDC GLUCOMTR-MCNC: 139 MG/DL (ref 70–99)
GLUCOSE SERPL-MCNC: 156 MG/DL (ref 65–99)
HCT VFR BLD AUTO: 35.1 % (ref 34–46.6)
HGB BLD-MCNC: 11.1 G/DL (ref 12–15.9)
MAGNESIUM SERPL-MCNC: 2.3 MG/DL (ref 1.6–2.6)
MCH RBC QN AUTO: 27.6 PG (ref 26.6–33)
MCHC RBC AUTO-ENTMCNC: 31.6 G/DL (ref 31.5–35.7)
MCV RBC AUTO: 87.3 FL (ref 79–97)
PHOSPHATE SERPL-MCNC: 3.8 MG/DL (ref 2.5–4.5)
PLATELET # BLD AUTO: 479 10*3/MM3 (ref 140–450)
PMV BLD AUTO: 9.7 FL (ref 6–12)
POTASSIUM SERPL-SCNC: 3.8 MMOL/L (ref 3.5–5.2)
RBC # BLD AUTO: 4.02 10*6/MM3 (ref 3.77–5.28)
SODIUM SERPL-SCNC: 139 MMOL/L (ref 136–145)
WBC NRBC COR # BLD: 13.52 10*3/MM3 (ref 3.4–10.8)

## 2023-05-12 PROCEDURE — 25010000002 CEFTRIAXONE PER 250 MG: Performed by: STUDENT IN AN ORGANIZED HEALTH CARE EDUCATION/TRAINING PROGRAM

## 2023-05-12 PROCEDURE — 25010000002 HEPARIN (PORCINE) PER 1000 UNITS: Performed by: STUDENT IN AN ORGANIZED HEALTH CARE EDUCATION/TRAINING PROGRAM

## 2023-05-12 PROCEDURE — 63710000001 DIPHENHYDRAMINE PER 50 MG: Performed by: STUDENT IN AN ORGANIZED HEALTH CARE EDUCATION/TRAINING PROGRAM

## 2023-05-12 PROCEDURE — 94618 PULMONARY STRESS TESTING: CPT

## 2023-05-12 PROCEDURE — 82948 REAGENT STRIP/BLOOD GLUCOSE: CPT

## 2023-05-12 PROCEDURE — 80069 RENAL FUNCTION PANEL: CPT | Performed by: FAMILY MEDICINE

## 2023-05-12 PROCEDURE — 63710000001 PREDNISONE PER 1 MG: Performed by: STUDENT IN AN ORGANIZED HEALTH CARE EDUCATION/TRAINING PROGRAM

## 2023-05-12 PROCEDURE — 94664 DEMO&/EVAL PT USE INHALER: CPT

## 2023-05-12 PROCEDURE — 94799 UNLISTED PULMONARY SVC/PX: CPT

## 2023-05-12 PROCEDURE — 63710000001 REVEFENACIN 175 MCG/3ML SOLUTION: Performed by: STUDENT IN AN ORGANIZED HEALTH CARE EDUCATION/TRAINING PROGRAM

## 2023-05-12 PROCEDURE — 99239 HOSP IP/OBS DSCHRG MGMT >30: CPT | Performed by: FAMILY MEDICINE

## 2023-05-12 PROCEDURE — 83735 ASSAY OF MAGNESIUM: CPT | Performed by: FAMILY MEDICINE

## 2023-05-12 PROCEDURE — 94761 N-INVAS EAR/PLS OXIMETRY MLT: CPT

## 2023-05-12 PROCEDURE — 85027 COMPLETE CBC AUTOMATED: CPT | Performed by: FAMILY MEDICINE

## 2023-05-12 RX ORDER — AMOXICILLIN AND CLAVULANATE POTASSIUM 875; 125 MG/1; MG/1
1 TABLET, FILM COATED ORAL 2 TIMES DAILY
Qty: 6 TABLET | Refills: 0 | Status: SHIPPED | OUTPATIENT
Start: 2023-05-13 | End: 2023-05-16

## 2023-05-12 RX ORDER — PREDNISONE 20 MG/1
40 TABLET ORAL DAILY
Qty: 4 TABLET | Refills: 0 | Status: SHIPPED | OUTPATIENT
Start: 2023-05-13 | End: 2023-05-15

## 2023-05-12 RX ORDER — CALCIUM CARBONATE 500 MG/1
1 TABLET, CHEWABLE ORAL ONCE AS NEEDED
Status: DISCONTINUED | OUTPATIENT
Start: 2023-05-12 | End: 2023-05-12 | Stop reason: HOSPADM

## 2023-05-12 RX ORDER — FLUCONAZOLE 150 MG/1
150 TABLET ORAL ONCE
Qty: 1 TABLET | Refills: 0 | Status: SHIPPED | OUTPATIENT
Start: 2023-05-12 | End: 2023-05-12

## 2023-05-12 RX ORDER — AZITHROMYCIN 250 MG/1
500 TABLET, FILM COATED ORAL DAILY
Qty: 2 TABLET | Refills: 0 | Status: SHIPPED | OUTPATIENT
Start: 2023-05-13 | End: 2023-05-14

## 2023-05-12 RX ADMIN — BUDESONIDE AND FORMOTEROL FUMARATE DIHYDRATE 2 PUFF: 160; 4.5 AEROSOL RESPIRATORY (INHALATION) at 06:29

## 2023-05-12 RX ADMIN — LEVOTHYROXINE SODIUM 112 MCG: 0.11 TABLET ORAL at 05:02

## 2023-05-12 RX ADMIN — BUSPIRONE HYDROCHLORIDE 10 MG: 10 TABLET ORAL at 15:22

## 2023-05-12 RX ADMIN — Medication 10 ML: at 09:04

## 2023-05-12 RX ADMIN — AZITHROMYCIN MONOHYDRATE 500 MG: 250 TABLET ORAL at 11:34

## 2023-05-12 RX ADMIN — PREDNISONE 40 MG: 20 TABLET ORAL at 09:01

## 2023-05-12 RX ADMIN — HEPARIN SODIUM 5000 UNITS: 5000 INJECTION INTRAVENOUS; SUBCUTANEOUS at 05:02

## 2023-05-12 RX ADMIN — ACETAMINOPHEN 650 MG: 325 TABLET ORAL at 04:49

## 2023-05-12 RX ADMIN — MONTELUKAST 10 MG: 10 TABLET, FILM COATED ORAL at 09:01

## 2023-05-12 RX ADMIN — LEVALBUTEROL HYDROCHLORIDE 1.25 MG: 1.25 SOLUTION RESPIRATORY (INHALATION) at 06:27

## 2023-05-12 RX ADMIN — METOPROLOL SUCCINATE 25 MG: 25 TABLET, EXTENDED RELEASE ORAL at 09:01

## 2023-05-12 RX ADMIN — HEPARIN SODIUM 5000 UNITS: 5000 INJECTION INTRAVENOUS; SUBCUTANEOUS at 15:19

## 2023-05-12 RX ADMIN — CEFTRIAXONE SODIUM 1 G: 1 INJECTION, SOLUTION INTRAVENOUS at 04:16

## 2023-05-12 RX ADMIN — FLUTICASONE PROPIONATE 2 SPRAY: 50 SPRAY, METERED NASAL at 09:08

## 2023-05-12 RX ADMIN — REVEFENACIN 175 MCG: 175 SOLUTION RESPIRATORY (INHALATION) at 06:28

## 2023-05-12 RX ADMIN — LEVALBUTEROL HYDROCHLORIDE 1.25 MG: 1.25 SOLUTION RESPIRATORY (INHALATION) at 11:13

## 2023-05-12 RX ADMIN — BUSPIRONE HYDROCHLORIDE 10 MG: 10 TABLET ORAL at 09:03

## 2023-05-12 RX ADMIN — PANTOPRAZOLE SODIUM 40 MG: 40 TABLET, DELAYED RELEASE ORAL at 05:01

## 2023-05-12 RX ADMIN — CETIRIZINE HYDROCHLORIDE 10 MG: 10 TABLET, FILM COATED ORAL at 09:01

## 2023-05-12 RX ADMIN — DULOXETINE HYDROCHLORIDE 120 MG: 30 CAPSULE, DELAYED RELEASE ORAL at 09:03

## 2023-05-12 RX ADMIN — DIPHENHYDRAMINE HYDROCHLORIDE 25 MG: 25 CAPSULE ORAL at 09:03

## 2023-05-12 NOTE — CASE MANAGEMENT/SOCIAL WORK
Discharge Planning Assessment  [facility]     Patient Name: Anna Solorio  MRN: [id number]  Today's Date: 5/12/2023    Admit Date: 5/10/2023    Plan: Pt admitted due to shortness of breath. Spoke with pt to assess needs. Pt lives at home with significant other and is independent in ADLs. Pt currently on 2L 02, does not wear 02 at baseline- will need 6MWT at discharge. Pt currently on nebulizer treatments, pt has a nebulizer at home. Pt denies any discharge needs at this time and plans to return home once stable. Pharmacy/PCP confirmed. Will follow for 02 needs.      Discharge Needs Assessment     Row Name 05/12/23 1042       Living Environment    People in Home significant other    Name(s) of People in Home Pt lives in [facility] with her significant other    Current Living Arrangements home    Primary Care Provided by self    Provides Primary Care For no one    Family Caregiver if Needed significant other    Family Caregiver Names Sean Lopez- Significant Other    Quality of Family Relationships supportive;involved;helpful    Able to Return to Prior Arrangements yes       Resource/Environmental Concerns    Resource/Environmental Concerns none       Transition Planning    Patient/Family Anticipates Transition to home    Patient/Family Anticipated Services at Transition durable medical equipment    Transportation Anticipated family or friend will provide       Discharge Needs Assessment    Readmission Within the Last 30 Days no previous admission in last 30 days    Equipment Currently Used at Home nebulizer    Concerns to be Addressed denies needs/concerns at this time    Equipment Needed After Discharge oxygen              Expected Discharge Date and Time     Expected Discharge Date Expected Discharge Time    May 13, 2023          Demographic Summary     Row Name 05/12/23 1041       General Information    Admission Type inpatient    Arrived From emergency department    Referral Source admission list    Reason for  Consult discharge planning    Preferred Language English       Contact Information    Permission Granted to Share Info With family/designee               Functional Status     Row Name 05/12/23 1041       Functional Status    Usual Activity Tolerance good    Current Activity Tolerance good       Functional Status, IADL    Medications independent    Meal Preparation independent    Housekeeping independent    Laundry independent    Shopping independent       Mental Status    General Appearance WDL WDL       Mental Status Summary    Recent Changes in Mental Status/Cognitive Functioning no changes               Psychosocial     Row Name 05/12/23 1042       Behavior WDL    Behavior WDL WDL       Emotion Mood WDL    Emotion/Mood/Affect WDL WDL       Speech WDL    Speech WDL WDL       Perceptual State WDL    Perceptual State WDL WDL       Thought Process WDL    Thought Process WDL WDL       Intellectual Performance WDL    Intellectual Performance WDL WDL       Coping/Stress    Major Change/Loss/Stressor none    Sources of Support significant other    Techniques to [place] with Loss/Stress/Change not applicable    Reaction to Health Status accepting    Understanding of Condition and Treatment adequate understanding of medical condition       Developmental Stage (Eriksson's)    Developmental Stage Stage 7 (35-65 years/Middle Adulthood) Generativity vs. Stagnation            ALICIA Foote

## 2023-05-12 NOTE — NURSING NOTE
Exercise Oximetry    Patient Name:Anna Solorio   MRN: [id number]   Date: 05/12/23             ROOM AIR BASELINE   SpO2% 92   Heart Rate 90   Blood Pressure      EXERCISE ON ROOM AIR SpO2% EXERCISE ON O2 @ LPM SpO2%   1 MINUTE 92 1 MINUTE    2 MINUTES 93 2 MINUTES    3 MINUTES 94 3 MINUTES    4 MINUTES 92 4 MINUTES    5 MINUTES 91 5 MINUTES    6 MINUTES 90 6 MINUTES               Distance Walked  360 Distance Walked   Dyspnea (Marium Scale)   Dyspnea (Marium Scale)   Fatigue (Marium Scale)   Fatigue (Marium Scale)   SpO2% Post Exercise  91 SpO2% Post Exercise   HR Post Exercise  101 HR Post Exercise   Time to Recovery   Time to Recovery     Comments:

## 2023-05-12 NOTE — DISCHARGE SUMMARY
[facility]         HOSPITALIST  DISCHARGE SUMMARY    Patient Name: Anna Solorio  : 1965  MRN: [id number]    Date of Admission: 5/10/2023  Date of Discharge: 2023  Primary Care Physician: Moris Dia MD    Consults     Date and Time Order Name Status Description    5/10/2023  9:08 PM Inpatient Hospitalist Consult            Active and Resolved Hospital Problems:  Sepsis  Acute hypoxic respiratory failure  Multifocal pneumonia on the left lung   Possible atypical pneumonia  COPD exacerbation  Hypokalemia  Hypomagnesemia  Hyperlipidemia  Anxiety  Hypothyroidism  Depression  Active Hospital Problems    Diagnosis POA   • **Shortness of breath [R06.02] Yes   • COPD with acute exacerbation [J44.1] Unknown      Resolved Hospital Problems   No resolved problems to display.       Hospital Course     Hospital Course:  Anna Solorio is a 57 y.o. female with a past medical history of COPD, anxiety, hypothyroidism, depression presented to the ED with complaints of shortness of breath that has started 2 days ago and has been progressively worsening.  Patient has initially seen some improvement with nebulizer therapy at home but continues to worsen so she came to the ED.  Associated with mildly worsening cough than baseline, increased productive sputum.  Denies any fevers, chills, nausea, vomiting, abdominal pain, focal weakness, numbness, tingling, diarrhea, dysuria.     Upon arrival to the ED, vital signs Temperature 99, pulse 116, respiratory 20, blood pressure 159/108 on 4 L of nasal cannula saturating around 92%.  Labs showed normal troponin, normal proBNP, potassium 2.6, bicarb 32.8, calcium 8.5, magnesium 1, WBC elevated 19.05, hemoglobin 10.8, platelets 423.  Blood cultures pending.  Chest x-ray, CT chest with contrast was done which showed no evidence of PE.  Numerous new tiny nodular and groundglass opacities throughout the left lung concerning for multifocal infection.   Advised emphysematous changes.  Large hiatal hernia.  Started on antibiotic coverage with ceftriaxone and azithromycin.  Received methylprednisolone 125 mg IV in the ED.  Patient  admitted for further evaluation and management of acute hypoxic respiratory failure secondary to multifocal pneumonia on the left with acute COPD exacerbation.  Patient continued on empiric antibiotics systemic steroids inhaled bronchodilators.  Continued on supplemental nasal cannula oxygen.  Shortness of breath much improved.  Patient was able to be weaned off supplemental O2 and passed a 6 minute walk test.  Patient very eager to return home hospital day 2.  Patient seen and evaluated on day of discharge and thought stable for discharge home to complete course of oral antibiotics and steroids and follow-up with her primary care provider and COPD clinic as an outpatient.        DISCHARGE Follow Up Recommendations for labs and diagnostics: As above      Day of Discharge     Vital Signs:  Temp:  [97.9 °F (36.6 °C)-98.5 °F (36.9 °C)] 97.9 °F (36.6 °C)  Heart Rate:  [] 102  Resp:  [18-20] 18  BP: (112-146)/(66-86) 146/86  Flow (L/min):  [1-3] 1  Physical Exam:   Gen. well-developed appearing stated age in no acute distress  HEENT: Normocephalic atraumatic moist membranes pupils equal round reactive light, no scleral icterus no conjunctival injection  Cardiovascular: regular rate and rhythm no murmurs rubs or gallops S1-S2, no lower extremity edema appreciated  Pulmonary: Scant expiratory wheezes, no rales or rhonchi symmetric chest expansion, unlabored, no conversational dyspnea appreciated  Gastrointestinal: Soft nontender nondistended positive bowel sounds all 4 quadrants no rebound or guarding  Musculoskeletal: No clubbing cyanosis, warm and well-perfused, calves soft symmetric nontender bilaterally  Skin: Clean dry without rashs  Neuro: Cranial nerves II through XII intact grossly no sensorimotor deficits appreciated bilateral  upper and lower extremities  Psych: Patient is calm cooperative and appropriate with exam not responding to internal stimuli  : No Ariza catheter no bladder distention no suprapubic tenderness      Discharge Details        Discharge Medications      New Medications      Instructions Start Date   amoxicillin-clavulanate 875-125 MG per tablet  Commonly known as: Augmentin   1 tablet, Oral, 2 Times Daily   Start Date: May 13, 2023     azithromycin 250 MG tablet  Commonly known as: ZITHROMAX   500 mg, Oral, Daily   Start Date: May 13, 2023     fluconazole 150 MG tablet  Commonly known as: Diflucan   150 mg, Oral, Once, For yeast infection      predniSONE 20 MG tablet  Commonly known as: DELTASONE   40 mg, Oral, Daily   Start Date: May 13, 2023        Continue These Medications      Instructions Start Date   albuterol sulfate  (90 Base) MCG/ACT inhaler  Commonly known as: PROVENTIL HFA;VENTOLIN HFA;PROAIR HFA   Inhale 2 puffs Every 4 (Four) Hours As Needed.      atorvastatin 10 MG tablet  Commonly known as: LIPITOR   Take 1 tablet by mouth Daily.      busPIRone 10 MG tablet  Commonly known as: BUSPAR   Take 1 tablet by mouth 3 (Three) Times a Day.      diphenhydrAMINE 25 mg capsule  Commonly known as: BENADRYL   25 mg, Oral, Daily      DULoxetine 60 MG capsule  Commonly known as: CYMBALTA   Take 2 capsules by mouth Daily.      fluticasone 50 MCG/ACT nasal spray  Commonly known as: FLONASE   2 sprays, Each Nare, Daily      levocetirizine 5 MG tablet  Commonly known as: XYZAL   5 mg, Oral, Daily      levothyroxine 112 MCG tablet  Commonly known as: SYNTHROID, LEVOTHROID   112 mcg, Oral, Daily      metoprolol succinate XL 25 MG 24 hr tablet  Commonly known as: TOPROL-XL   Take 1 tablet by mouth Daily.      montelukast 10 MG tablet  Commonly known as: SINGULAIR   10 mg, Oral, Daily      omeprazole 40 MG capsule  Commonly known as: priLOSEC   40 mg, Oral, 2 Times Daily      Umeclidinium-Vilanterol 62.5-25 MCG/ACT  aerosol powder  inhaler  Commonly known as: ANORO ELLIPTA   1 puff, Inhalation, Daily             No Known Allergies    Discharge Disposition:  Home or Self Care    Diet:  Hospital:  Diet Order   Procedures   • Diet: Regular/House Diet; Texture: Regular Texture (IDDSI 7); Fluid Consistency: Thin (IDDSI 0)       Discharge Activity:       CODE STATUS:  Code Status and Medical Interventions:   Ordered at: 05/11/23 0136     Level Of Support Discussed With:    Patient     Code Status (Patient has no pulse and is not breathing):    CPR (Attempt to Resuscitate)     Medical Interventions (Patient has pulse or is breathing):    Full Support         Future Appointments   Date Time Provider Department Center   5/18/2023 10:00 AM Trice Robbins APRN [facility]   7/6/2023  1:30 PM Gera Colon MD [facility]       Additional Instructions for the Follow-ups that You Need to Schedule     Discharge Follow-up with PCP   As directed       Currently Documented PCP:    Moris Dia MD    PCP Phone Number:    [phone]     Follow Up Details: Hospital discharge follow-up COPD with acute exacerbation, community-acquired pneumonia         Discharge Follow-up with Specialty: COPD clinic Dr. Colon's office   As directed      Specialty: COPD clinic Dr. Colon's office    Follow Up Details: 1 to 2 weeks Hospital discharge follow-up COPD exacerbation, community-acquired pneumonia               Pertinent  and/or Most Recent Results     PROCEDURES:   None    LAB RESULTS:      Lab 05/12/23 0518 05/11/23  0526 05/10/23  1831   WBC 13.52* 15.63* 19.05*   HEMOGLOBIN 11.1* 11.4* 10.8*   HEMATOCRIT 35.1 34.3 32.7*   PLATELETS 479* 435 423   NEUTROS ABS  --  15.06* 17.07*   IMMATURE GRANS (ABS)  --  0.06* 0.12*   LYMPHS ABS  --  0.43* 1.06   MONOS ABS  --  0.04* 0.62   EOS ABS  --  0.00 0.12   MCV 87.3 83.5 83.4   PROCALCITONIN  --   --  0.12         Lab 05/12/23 0518 05/11/23  0526 05/10/23  1831   SODIUM 139 140 140    POTASSIUM 3.8 3.5 2.6*   CHLORIDE 102 97* 97*   CO2 30.0* 31.2* 32.8*   ANION GAP 7.0 11.8 10.2   BUN 12 5* 7   CREATININE 0.64 0.59 0.67   EGFR 102.6 104.6 101.5   GLUCOSE 156* 235* 173*   CALCIUM 8.7 8.5* 8.5*   MAGNESIUM 2.3 1.2* 1.0*   PHOSPHORUS 3.8 3.2 2.9   HEMOGLOBIN A1C  --  7.00*  --          Lab 05/12/23  0518 05/11/23  0526 05/10/23  1831   TOTAL PROTEIN  --  7.6 7.4   ALBUMIN 3.6 3.9 4.1   GLOBULIN  --  3.7 3.3   ALT (SGPT)  --  19 20   AST (SGOT)  --  17 18   BILIRUBIN  --  0.3 0.4   ALK PHOS  --  100 108         Lab 05/10/23  1831   PROBNP 142.1   HSTROP T 7                 Brief Urine Lab Results     None        Microbiology Results (last 10 days)     Procedure Component Value - Date/Time    COVID-19,APTIMA PANTHER(AVA),[facility], NP/OP SWAB IN UTM/VTM/SALINE TRANSPORT MEDIA,24 HR TAT - Swab, Nasal Cavity [[id number]]  (Normal) Collected: 05/11/23 0335    Lab Status: Final result Specimen: Swab from Nasal Cavity Updated: 05/11/23 1528     COVID19 Not Detected    Narrative:      Fact sheet for providers: https://www.fda.gov/media/963939/download     Fact sheet for patients: https://www.fda.gov/media/975177/download    Test performed by RT PCR.    Legionella Antigen, Urine - Urine, Urine, Clean Catch [[id number]]  (Normal) Collected: 05/11/23 0335    Lab Status: Final result Specimen: Urine, Clean Catch Updated: 05/11/23 0641     LEGIONELLA ANTIGEN, URINE Negative    S. Pneumo Ag Urine or CSF - Urine, Urine, Clean Catch [[id number]]  (Normal) Collected: 05/11/23 0335    Lab Status: Final result Specimen: Urine, Clean Catch Updated: 05/11/23 0641     Strep Pneumo Ag Negative    Influenza Antigen, Rapid - Swab, Nasopharynx [[id number]]  (Normal) Collected: 05/11/23 0250    Lab Status: Final result Specimen: Swab from Nasopharynx Updated: 05/11/23 0332     Influenza A Ag, EIA Negative     Influenza B Ag, EIA Negative    Blood Culture - Blood, Hand, Left [[id number]]  (Normal) Collected: 05/11/23 0043     Lab Status: Preliminary result Specimen: Blood from Hand, Left Updated: 05/12/23 0115     Blood Culture No growth at 24 hours    Blood Culture - Blood, Hand, Left [[id number]]  (Normal) Collected: 05/11/23 0043    Lab Status: Preliminary result Specimen: Blood from Hand, Left Updated: 05/12/23 0115     Blood Culture No growth at 24 hours    Mycoplasma Pneumoniae Antibody, IgM - Blood, [[id number]]  (Normal) Collected: 05/10/23 1831    Lab Status: Final result Specimen: Blood Updated: 05/11/23 1149     Mycoplasma pneumo IgM Negative          CT Chest With Contrast Diagnostic    Result Date: 5/10/2023  Impression:   1. No evidence of pulmonary emboli. 2. Numerous new tiny nodular and ground-glass opacities throughout the left lung, concerning for multifocal infection. 3. Advanced emphysematous changes. 4. Large hiatal hernia.     ADEEL DYE MD       Electronically Signed and Approved By: ADEEL DYE MD on 5/10/2023 at 22:01                           Labs Pending at Discharge:  Pending Labs     Order Current Status    Respiratory Panel, PCR (WITHOUT COVID) - Swab, Nasopharynx In process    Blood Culture - Blood, Hand, Left Preliminary result    Blood Culture - Blood, Hand, Left Preliminary result            Time spent on Discharge including face to face service: Greater than 35 minutes    Electronically signed by Naif Fuller MD, 05/12/23, 3:43 PM EDT.

## 2023-05-13 NOTE — OUTREACH NOTE
Prep Survey    Flowsheet Row Responses   [facility] facility patient discharged from? [facility]   Is LACE score < 7 ? Yes   Eligibility Not Eligible   What are the reasons patient is not eligible? Other  [low risk readmission]   Does the patient have one of the following disease processes/diagnoses(primary or secondary)? Pneumonia   Prep survey completed? Yes          ANAI MORALES - Registered Nurse

## 2023-05-15 LAB
B PERT.PT PRMT NPH QL NAA+NON-PROBE: NORMAL
C PNEUM DNA NPH QL NAA+NON-PROBE: NORMAL
FLUAV H1 2009 PAN RNA NPH NAA+NON-PROBE: NORMAL
FLUAV H1 RNA NPH QL NAA+NON-PROBE: NORMAL
FLUAV H3 RNA NPH QL NAA+NON-PROBE: NORMAL
FLUAV RNA NPH QL NAA+NON-PROBE: NORMAL
FLUBV RNA NPH QL NAA+NON-PROBE: NORMAL
HADV DNA NPH QL NAA+NON-PROBE: NORMAL
HCOV 229E RNA NPH QL NAA+NON-PROBE: NORMAL
HCOV HKU1 RNA NPH QL NAA+NON-PROBE: NORMAL
HCOV NL63 RNA NPH QL NAA+NON-PROBE: NORMAL
HCOV OC43 RNA NPH QL NAA+NON-PROBE: NORMAL
HMPV RNA NPH QL NAA+NON-PROBE: NORMAL
HPIV1 RNA NPH QL NAA+NON-PROBE: NORMAL
HPIV2 RNA NPH QL NAA+NON-PROBE: NORMAL
HPIV3 RNA NPH QL NAA+NON-PROBE: NORMAL
HPIV4 RNA NPH QL NAA+NON-PROBE: NORMAL
M PNEUMO DNA NPH QL NAA+NON-PROBE: NORMAL
RSV RNA NPH QL NAA+NON-PROBE: NORMAL
RV+EV RNA NPH QL NAA+NON-PROBE: NORMAL

## 2023-05-15 RX ORDER — FLUTICASONE PROPIONATE 110 UG/1
2 AEROSOL, METERED RESPIRATORY (INHALATION)
Qty: 12 G | Refills: 11 | Status: CANCELLED | OUTPATIENT
Start: 2023-05-15

## 2023-05-15 NOTE — PROGRESS NOTES
Primary Care Provider  Moris Dia MD   Referring Provider  No ref. provider found      Patient Complaint  Hospital Follow Up Visit (Pneumonia and COPD exacerbation/Complains of Allergies) and Results (PFT)      Subjective          Anna Solorio presents to [facility]ULMONARY [facility]INE      History of Presenting Illness  Anna Solorio is a 57 y.o. female patient of Dr. Colon with history of asthma-COPD overlap syndrome, dyspnea on exertion, and tobacco abuse in remission, here for hospital follow-up in COPD clinic.    Patient was hospitalized at [facility] 5/10/2023 through 5/12/2023 for sepsis, COPD exacerbation, acute hypoxic respiratory failure, and multifocal pneumonia.  She was previously seen a couple weeks before in our outpatient clinic for COPD exacerbation at which time she completed 1 week of amoxicillin.  Patient presented to the ED with shortness of breath x2 days that had progressively worsened.  She had tried using her nebulizer treatments at home but shortness of air continued to get worse.  She also had a cough worse than her usual chronic cough and increased sputum production.  She had a low-grade fever in the ED 99.0, was a little tachycardic 116 bpm.  She was placed on 4 L supplemental oxygen with saturations around 92%.  White blood cells were elevated at 19.05.  CXR and chest CT negative for PE, but did show numerous new tiny nodular and groundglass opacities throughout the left lung concerning for multifocal pneumonia.  Patient was started on broad-spectrum antibiotics, ceftriaxone and azithromycin.  She was also started on IV Solu-Medrol and scheduled bronchodilators.  Pulmonology does not appear to have been consulted during this hospital stay.  Shortness of breath improved over the next 24 hours, patient able to be weaned off supplemental oxygen and passed 6-minute walk test prior to discharge.  Patient discharged home in stable  condition with instructions to complete course of oral Augmentin, azithromycin, and prednisone, follow-up in COPD clinic.    Patient states she is doing well since being discharged from the hospital.  She has since completed her antibiotic and prednisone that was prescribed in the hospital.  Patient denies any fevers or chills.  She does complain of some allergies today, cough productive of clear sputum.  Patient currently uses her Anoro Ellipta inhaler daily as well as her albuterol rescue inhaler and albuterol neb treatments as needed.  She also takes Singulair nightly for allergies, uses Benadryl and Flonase as needed. Patient continues to work full-time as an .  She is a former smoker, quit 7 years ago, 54 pack years.  Patient denies any productive cough, hemoptysis, swollen lymph nodes, weight loss, or night sweats.  Overall, patient is doing well and has no additional concerns at this time.  Patient is able to perform ADLs without difficulty.  I have personally reviewed the review of systems, past family, social, medical and surgical histories; and agree with their findings.      Review of Systems    Review of Systems   Constitutional: Negative for activity change, chills, fatigue, fever, unexpected weight gain and unexpected weight loss.   HENT: Positive for congestion (allergy-related). Negative for ear discharge, ear pain, mouth sores, postnasal drip, rhinorrhea, sinus pressure, sore throat, swollen glands and trouble swallowing.    Eyes: Negative for blurred vision, pain, discharge, itching and visual disturbance.   Respiratory: Positive for cough (intermittent, productive of clear sputum). Negative for apnea, chest tightness, shortness of breath, wheezing and stridor.    Cardiovascular: Negative for chest pain, palpitations and leg swelling.   Gastrointestinal: Negative for abdominal distention, abdominal pain, constipation, diarrhea, nausea, vomiting, GERD and indigestion.    Musculoskeletal: Negative for arthralgias, joint swelling and myalgias.   Skin: Negative for color change.   Neurological: Negative for dizziness, weakness, light-headedness and headache.      Sleep: Negative for Excessive daytime sleepiness  Negative for morning headaches  Negative for Snoring      Family History   Problem Relation Age of Onset   • Leukemia Mother    • Leukemia Father         Social History     Socioeconomic History   • Marital status: Single   Tobacco Use   • Smoking status: Former     Packs/day: 1.50     Years: 36.00     Pack years: 54.00     Types: Cigarettes     Start date:      Quit date: 2015     Years since quittin.9     Passive exposure: Past   • Smokeless tobacco: Never   Vaping Use   • Vaping Use: Never used   Substance and Sexual Activity   • Alcohol use: Not Currently     Comment: social   • Drug use: Never   • Sexual activity: Defer        Past Medical History:   Diagnosis Date   • Allergic rhinitis    • Anemia    • Anxiety    • Depression    • Diverticulitis    • GERD (gastroesophageal reflux disease)    • Mood disorder    • Sleep apnea         Immunization History   Administered Date(s) Administered   • COVID-19 (MODERNA) 1st,2nd,3rd Dose Monovalent 2021, 2021, 2021   • COVID-19 (MODERNA) BIVALENT 12+YRS 10/26/2022   • FluLaval/Fluzone >6mos 10/13/2022   • Pneumococcal Polysaccharide (PPSV23) 2022       No Known Allergies       Current Outpatient Medications:   •  albuterol sulfate  (90 Base) MCG/ACT inhaler, Inhale 2 puffs Every 4 (Four) Hours As Needed., Disp: , Rfl:   •  atorvastatin (LIPITOR) 10 MG tablet, Take 1 tablet by mouth Daily., Disp: , Rfl:   •  busPIRone (BUSPAR) 10 MG tablet, Take 1 tablet by mouth 3 (Three) Times a Day., Disp: , Rfl:   •  diphenhydrAMINE (BENADRYL) 25 mg capsule, Take 1 capsule by mouth Daily., Disp: , Rfl:   •  DULoxetine (CYMBALTA) 60 MG capsule, Take 2 capsules by mouth Daily., Disp: , Rfl:   •   "fluconazole (DIFLUCAN) 150 MG tablet, TAKE ONE TABLET BY MOUTH ONCE FOR 1 DOSE FOR YEAST INFECTION., Disp: , Rfl:   •  fluticasone (FLONASE) 50 MCG/ACT nasal spray, 2 sprays by Each Nare route Daily., Disp: , Rfl:   •  levocetirizine (XYZAL) 5 MG tablet, Take 1 tablet by mouth Daily., Disp: , Rfl:   •  levothyroxine (SYNTHROID, LEVOTHROID) 112 MCG tablet, Take 1 tablet by mouth Daily., Disp: , Rfl:   •  metoprolol succinate XL (TOPROL-XL) 25 MG 24 hr tablet, Take 1 tablet by mouth Daily., Disp: , Rfl:   •  montelukast (SINGULAIR) 10 MG tablet, Take 1 tablet by mouth Daily., Disp: , Rfl:   •  omeprazole (priLOSEC) 40 MG capsule, Take 1 capsule by mouth 2 (Two) Times a Day., Disp: , Rfl:   •  Umeclidinium-Vilanterol (ANORO ELLIPTA) 62.5-25 MCG/ACT aerosol powder  inhaler, Inhale 1 puff Daily., Disp: 1 each, Rfl: 11  •  Budeson-Glycopyrrol-Formoterol (Breztri Aerosphere) 160-9-4.8 MCG/ACT aerosol inhaler, Inhale 2 puffs 2 (Two) Times a Day., Disp: 1 each, Rfl: 11     Objective     Vital Signs:   /99 (BP Location: Left arm, Patient Position: Sitting, Cuff Size: Large Adult)   Pulse 81   Temp 97.8 °F (36.6 °C) (Tympanic)   Resp 16   Ht 154.9 cm (61\")   Wt 84.3 kg (185 lb 14.4 oz)   SpO2 98% Comment: ROOM AIR  BMI 35.13 kg/m²     Objective   Physical Exam  Constitutional:       General: She is not in acute distress.     Appearance: Normal appearance. She is obese. She is not ill-appearing.   HENT:      Right Ear: Tympanic membrane and ear canal normal.      Left Ear: Tympanic membrane and ear canal normal.      Nose: Nose normal.      Mouth/Throat:      Mouth: Mucous membranes are moist.      Pharynx: Oropharynx is clear.   Eyes:      Extraocular Movements: Extraocular movements intact.      Conjunctiva/sclera: Conjunctivae normal.      Pupils: Pupils are equal, round, and reactive to light.   Cardiovascular:      Rate and Rhythm: Normal rate and regular rhythm.      Pulses: Normal pulses.      Heart sounds: " Normal heart sounds.   Pulmonary:      Effort: Pulmonary effort is normal. No respiratory distress.      Breath sounds: No stridor. Rhonchi (faint, scattered REHAN) present. No wheezing or rales.   Abdominal:      General: Bowel sounds are normal.      Palpations: Abdomen is soft.   Musculoskeletal:         General: No swelling. Normal range of motion.      Cervical back: Normal range of motion and neck supple.      Right lower leg: No edema.      Left lower leg: No edema.   Skin:     General: Skin is warm and dry.   Neurological:      General: No focal deficit present.      Mental Status: She is alert and oriented to person, place, and time.      Motor: No weakness.   Psychiatric:         Mood and Affect: Mood normal.         Behavior: Behavior normal.        Result Review :   I have personally reviewed patient's labs and images.  I reviewed ED provider note 5/10/2023 and Dr. Fuller's discharge summary 5/12/2023.  I also reviewed Dr. Colon's last office note 4/3/2023.            Diagnoses and all orders for this visit:    1. Chronic obstructive pulmonary disease, unspecified COPD type (Primary)  -     Alpha - 1 - Antitrypsin; Future  -     Budeson-Glycopyrrol-Formoterol (Breztri Aerosphere) 160-9-4.8 MCG/ACT aerosol inhaler; Inhale 2 puffs 2 (Two) Times a Day.  Dispense: 1 each; Refill: 11    2. Mild intermittent asthma without complication  -     Budeson-Glycopyrrol-Formoterol (Breztri Aerosphere) 160-9-4.8 MCG/ACT aerosol inhaler; Inhale 2 puffs 2 (Two) Times a Day.  Dispense: 1 each; Refill: 11    3. Shortness of breath  -     Alpha - 1 - Antitrypsin; Future  -     Budeson-Glycopyrrol-Formoterol (Breztri Aerosphere) 160-9-4.8 MCG/ACT aerosol inhaler; Inhale 2 puffs 2 (Two) Times a Day.  Dispense: 1 each; Refill: 11    4. Chronic cough    5. Seasonal allergies    6. Obesity (BMI 30-39.9)    7. Tobacco abuse, in remission       Impression and Plan    -PFTs 5/5/2023 showed severe obstructive airway disease, FEV1  49% of predicted, significant response to bronchodilator with 14% improvement in FEV1.  Air trapping present, but no hyperinflation, DLCO moderately reduced 54% of predicted.  These results were discussed with patient at today's visit.  -CT chest with contrast 5/10/2023 showed no evidence of PE, there were numerous new tiny nodular and groundglass opacities throughout the left lung concerning for multifocal pneumonia.  There were also advanced emphysematous changes.  -Last LDCT screening August 2022 showed no suspicious pulmonary nodules and recommended follow-up LDCT in 12 months  -Alpha-1 antitrypsin levels and genotype ordered today  -Given her PFT results with significant response to bronchodilator, will switch patient to inhaler with ICS.  Breztri samples given to patient in clinic today and sent to her pharmacy as well.  Patient instructed to discontinue Anoro once she starts Breztri.  -Continue using albuterol rescue inhaler as needed  -Continue taking Singulair nightly and Benadryl and Flonase as needed for seasonal allergies  -Patient to keep regularly scheduled appointment with Dr. Colon 7/6/2023, may return sooner if needed    Smoking status: Reviewed  Vaccination status: Patient reports she is up-to-date with her flu, pneumonia, and Covid vaccines.  Patient is advised to continue to follow CDC recommendations such as social distancing wearing a mask and washing hands for at least 20 seconds.  Medications personally reviewed    Follow Up   Return for Next scheduled follow up.  Patient was given instructions and counseling regarding her condition or for health maintenance advice. Please see specific information pulled into the AVS if appropriate.

## 2023-05-16 LAB
BACTERIA SPEC AEROBE CULT: NORMAL
BACTERIA SPEC AEROBE CULT: NORMAL

## 2023-05-18 ENCOUNTER — OFFICE VISIT (OUTPATIENT)
Dept: PULMONOLOGY | Facility: CLINIC | Age: 58
End: 2023-05-18
Payer: COMMERCIAL

## 2023-05-18 VITALS
HEART RATE: 81 BPM | BODY MASS INDEX: 35.1 KG/M2 | SYSTOLIC BLOOD PRESSURE: 132 MMHG | DIASTOLIC BLOOD PRESSURE: 99 MMHG | OXYGEN SATURATION: 98 % | TEMPERATURE: 97.8 F | RESPIRATION RATE: 16 BRPM | HEIGHT: 61 IN | WEIGHT: 185.9 LBS

## 2023-05-18 DIAGNOSIS — R05.3 CHRONIC COUGH: ICD-10-CM

## 2023-05-18 DIAGNOSIS — F17.201 TOBACCO ABUSE, IN REMISSION: ICD-10-CM

## 2023-05-18 DIAGNOSIS — E66.9 OBESITY (BMI 30-39.9): ICD-10-CM

## 2023-05-18 DIAGNOSIS — J30.2 SEASONAL ALLERGIES: ICD-10-CM

## 2023-05-18 DIAGNOSIS — J45.20 MILD INTERMITTENT ASTHMA WITHOUT COMPLICATION: ICD-10-CM

## 2023-05-18 DIAGNOSIS — J44.9 CHRONIC OBSTRUCTIVE PULMONARY DISEASE, UNSPECIFIED COPD TYPE: Primary | ICD-10-CM

## 2023-05-18 DIAGNOSIS — R06.02 SHORTNESS OF BREATH: ICD-10-CM

## 2023-05-18 RX ORDER — BUDESONIDE, GLYCOPYRROLATE, AND FORMOTEROL FUMARATE 160; 9; 4.8 UG/1; UG/1; UG/1
2 AEROSOL, METERED RESPIRATORY (INHALATION) 2 TIMES DAILY
Qty: 4 EACH | Refills: 0 | COMMUNITY
Start: 2023-05-18 | End: 2023-05-19

## 2023-05-18 RX ORDER — FLUCONAZOLE 150 MG/1
TABLET ORAL
COMMUNITY
Start: 2023-05-12

## 2023-05-18 RX ORDER — BUDESONIDE, GLYCOPYRROLATE, AND FORMOTEROL FUMARATE 160; 9; 4.8 UG/1; UG/1; UG/1
2 AEROSOL, METERED RESPIRATORY (INHALATION) 2 TIMES DAILY
Qty: 1 EACH | Refills: 11 | Status: SHIPPED | OUTPATIENT
Start: 2023-05-18

## 2023-05-22 ENCOUNTER — TELEPHONE (OUTPATIENT)
Dept: PULMONOLOGY | Facility: CLINIC | Age: 58
End: 2023-05-22
Payer: COMMERCIAL

## 2023-05-22 NOTE — TELEPHONE ENCOUNTER
Tried to call patient no answer left voicemail to call office back.     I called in regards to Breztri inhaler. I did a prior authoriaztion on inhaler but it was denied. Need to discuss with patient if she has tried any of the formulary inhalers. [facility] states formulary alternatives are: Advair Diskus, Symbicort, Spiriva Handihaler/Respimat, Bevespi and Yupelri.     Once I speak with Patient to see if she has tried any formulary alternatives. I will speak with CHRISTINA Carey to see how she would like to proceed.

## 2023-05-23 DIAGNOSIS — J44.9 CHRONIC OBSTRUCTIVE PULMONARY DISEASE, UNSPECIFIED COPD TYPE: Primary | ICD-10-CM

## 2023-05-23 DIAGNOSIS — R06.02 SHORTNESS OF BREATH: ICD-10-CM

## 2023-05-23 DIAGNOSIS — J45.20 MILD INTERMITTENT ASTHMA WITHOUT COMPLICATION: ICD-10-CM

## 2023-05-23 RX ORDER — TIOTROPIUM BROMIDE INHALATION SPRAY 3.12 UG/1
2 SPRAY, METERED RESPIRATORY (INHALATION)
Qty: 1 EACH | Refills: 11 | Status: SHIPPED | OUTPATIENT
Start: 2023-05-23

## 2023-05-23 RX ORDER — BUDESONIDE AND FORMOTEROL FUMARATE DIHYDRATE 80; 4.5 UG/1; UG/1
2 AEROSOL RESPIRATORY (INHALATION) 2 TIMES DAILY
Qty: 1 EACH | Refills: 11 | Status: SHIPPED | OUTPATIENT
Start: 2023-05-23

## 2023-05-23 NOTE — TELEPHONE ENCOUNTER
Ok, I will send over a couple of the formulary alternatives. Could you let the patient know that I'm sending Symbicort and Spiriva to her pharmacy? Thanks!

## 2023-05-23 NOTE — TELEPHONE ENCOUNTER
I called patients pharmacy to make sure Symbicort and Spiriva would not require a PA. They both went through with a $25 copay each. I called and notified that patient. I informed patient to not take Breztri along with the two new inhalers. Patient understood and had no further questions.

## 2023-05-29 NOTE — PROGRESS NOTES
"Enter Query Response Below      Query Response: No    Electronically signed by Naif Fuller MD, 23, 6:30 PM EDT.               If applicable, please update the problem list.     ----- Message -----  From: Namrata Corona RN  Sent: 2023  10:42 AM CDT  To: Naif Fuller MD  Subject: CDI Query                                             Patient: Anna Solorio        : 1965  Account: [id number]           Admit Date: 5/10/2023                                                    How to Respond to this query:                   a. Click New Note                b. Answer query within the yellow box.                c. Update the Problem List, if applicable.        If you have any questions about this query contact me at: [phone]      :     57-year-old patient presented with shortness of breath and was admitted with pneumonia, COPD exacerbation, and acute hypoxic respiratory failure on 5/10/2023. Per ED Provider Note, \"Pulmonary effort is normal. No respiratory distress\" and wheezing noted to bilateral lung lockwood. Per H&P, \"mild diffuse wheezing noted bilaterally, nonlabored respirations\". SPO2 on arrival was 88% on room air with respiratory rate of 20.   Treatment has included supplemental oxygen at 4 LPM, Solu-Medrol IV, Proventil nebulizer, Revefenacin nebulizer, and IV antibiotics. After study, was the diagnosis of acute hypoxic respiratory failure clinically supported during this admission?     Yes, please include additional clinical indicators: _______________  No  Other, please specify: ________________  Unable to clinically determine     By submitting this query, we are merely seeking further clarification of documentation to accurately reflect all conditions that you are monitoring, evaluating, treating or that extend the hospitalization or utilize additional resources of care. Please utilize your independent clinical judgment when addressing the question(s) above.      This " query and your response, once completed, will be entered into the legal medical record.     Sincerely,  Namrata Corona RN, BSN, CCDS  Clinical Documentation Integrity Program   [email or web address]

## 2023-05-29 NOTE — PROGRESS NOTES
Enter Query Response Below      Query Response: Sepsis - Ruled In    Electronically signed by Naif Fuller MD, 23, 6:30 PM EDT.              If applicable, please update the problem list.     ----- Message -----  From: Namrata Corona RN  Sent: 2023  10:58 AM CDT  To: Naif Fuller MD  Subject: CDI Query                                             Patient: Anna Solorio        : 1965  Account: [id number]           Admit Date: 5/10/2023                                                    How to Respond to this query:                   a. Click New Note                b. Answer query within the yellow box.                c. Update the Problem List, if applicable.        If you have any questions about this query contact me at: [phone]      :     57 year old patient admitted with COPD exacerbation, multifocal pneumonia, acute hypoxic respiratory failure and sepsis. Sepsis is not discussed in the body of the Discharge Summary. On arrival, patient had blood glucose 173, WBC 19.05, heart rate 116, and SPO2 88% on room air. Treatment has included Azithromycin IV,  Rocephin IV, and discharged home on Augmentin and Azithromycin PO.   After study, can the diagnosis of sepsis be further clarified as:     Sepsis - Ruled In  Sepsis - Ruled Out  Other, please specify: _____________  Unable to clinically determine      By submitting this query, we are merely seeking further clarification of documentation to accurately reflect all conditions that you are monitoring, evaluating, treating or that extend the hospitalization or utilize additional resources of care. Please utilize your independent clinical judgment when addressing the question(s) above.      This query and your response, once completed, will be entered into the legal medical record.     Sincerely,  Namrata Corona RN, BSN, CCDS  Clinical Documentation Integrity Program   [email or web address]

## 2023-07-24 ENCOUNTER — OFFICE VISIT (OUTPATIENT)
Dept: PULMONOLOGY | Facility: CLINIC | Age: 58
End: 2023-07-24
Payer: COMMERCIAL

## 2023-07-24 ENCOUNTER — HOSPITAL ENCOUNTER (OUTPATIENT)
Dept: GENERAL RADIOLOGY | Facility: HOSPITAL | Age: 58
Discharge: HOME | End: 2023-07-24
Payer: COMMERCIAL

## 2023-07-24 VITALS
WEIGHT: 190 LBS | SYSTOLIC BLOOD PRESSURE: 103 MMHG | HEART RATE: 110 BPM | TEMPERATURE: 97.7 F | OXYGEN SATURATION: 94 % | HEIGHT: 61 IN | RESPIRATION RATE: 18 BRPM | DIASTOLIC BLOOD PRESSURE: 78 MMHG | BODY MASS INDEX: 35.87 KG/M2

## 2023-07-24 DIAGNOSIS — F17.201 TOBACCO ABUSE, IN REMISSION: ICD-10-CM

## 2023-07-24 DIAGNOSIS — R06.02 SHORTNESS OF BREATH: ICD-10-CM

## 2023-07-24 DIAGNOSIS — E66.9 OBESITY (BMI 30-39.9): ICD-10-CM

## 2023-07-24 DIAGNOSIS — J30.2 SEASONAL ALLERGIES: ICD-10-CM

## 2023-07-24 DIAGNOSIS — J44.9 CHRONIC OBSTRUCTIVE PULMONARY DISEASE, UNSPECIFIED COPD TYPE: Primary | ICD-10-CM

## 2023-07-24 DIAGNOSIS — R05.3 CHRONIC COUGH: ICD-10-CM

## 2023-07-24 DIAGNOSIS — J44.1 COPD WITH ACUTE EXACERBATION: ICD-10-CM

## 2023-07-24 DIAGNOSIS — J45.20 MILD INTERMITTENT ASTHMA WITHOUT COMPLICATION: ICD-10-CM

## 2023-07-24 DIAGNOSIS — J18.9 PNEUMONIA OF LEFT LOWER LOBE DUE TO INFECTIOUS ORGANISM: ICD-10-CM

## 2023-07-24 DIAGNOSIS — R52 BODY ACHES: ICD-10-CM

## 2023-07-24 LAB
EXPIRATION DATE: NORMAL
EXPIRATION DATE: NORMAL
FLUAV AG NPH QL: NEGATIVE
FLUBV AG NPH QL: NEGATIVE
INTERNAL CONTROL: NORMAL
INTERNAL CONTROL: NORMAL
Lab: NORMAL
Lab: NORMAL
SARS-COV-2 AG UPPER RESP QL IA.RAPID: NOT DETECTED

## 2023-07-24 PROCEDURE — 99214 OFFICE O/P EST MOD 30 MIN: CPT

## 2023-07-24 PROCEDURE — 87804 INFLUENZA ASSAY W/OPTIC: CPT

## 2023-07-24 PROCEDURE — 71046 X-RAY EXAM CHEST 2 VIEWS: CPT

## 2023-07-24 PROCEDURE — 87426 SARSCOV CORONAVIRUS AG IA: CPT

## 2023-07-24 RX ORDER — ALBUTEROL SULFATE 2.5 MG/3ML
2.5 SOLUTION RESPIRATORY (INHALATION) EVERY 4 HOURS PRN
COMMUNITY

## 2023-07-24 RX ORDER — PREDNISONE 10 MG/1
TABLET ORAL
Qty: 31 TABLET | Refills: 0 | Status: SHIPPED | OUTPATIENT
Start: 2023-07-24

## 2023-07-24 RX ORDER — DOXYCYCLINE HYCLATE 100 MG/1
100 CAPSULE ORAL 2 TIMES DAILY
Qty: 20 CAPSULE | Refills: 0 | Status: SHIPPED | OUTPATIENT
Start: 2023-07-24

## 2023-07-24 NOTE — PROGRESS NOTES
"Primary Care Provider  Moris Dia MD   Referring Provider  No ref. provider found      Patient Complaint  Shortness of Breath (Oxygen dropping with exertion), Pain (Left Lung pain with deep breaths), Cough, Acute Visit, Pain With Breathing, Dizziness (Light headed and dizzy when ambulating), and Generalized Body Aches      Subjective          Anna Solorio presents to [facility] PULMONARY [facility]ICAL [facility]ARE MEDICINE      History of Presenting Illness  Anna Solorio is a 57 y.o. female patient of Dr. Colon with history of asthma-COPD overlap syndrome, chronic cough, dyspnea on exertion, and tobacco abuse in remission, here for acute visit.    Patient presents today with acute symptoms of shortness of breath, cough, dizziness, lightheadedness, and left lower lobe pain with deep breaths.  She reports that at home her oxygen saturation has gotten as low as 85%, improved with rest and breathing treatments.  Patient was doing well all summer mostly staying at home and indoors, until she went to an outdoor concert 2 days ago in [place] with lots of walking, fireworks, and smoke.  Yesterday she felt a \"tickle\" in her throat and this morning she woke up with her oxygen level in the mid 80s.  She is worried that she may be getting pneumonia again.  She was last hospitalized in May 2023 for multifocal pneumonia and COPD exacerbation.  Patient denies any fevers or chills, denies any chest pain except for when she coughs.  Patient currently uses Breztri daily, as well as her albuterol rescue inhaler and albuterol neb treatments as needed.  She also takes Singulair nightly for allergies, uses Benadryl and Flonase as needed. Patient continues to work full-time as an  and is eager to get her symptoms under control before returning to school in a couple of weeks.  She is a former smoker, quit 8 years ago, 54 pack years.  Patient denies any hemoptysis, swollen lymph nodes, " weight loss, or night sweats.  Patient is able to perform ADLs without difficulty.  I have personally reviewed the review of systems, past family, social, medical and surgical histories; and agree with their findings.      Review of Systems    Review of Systems   Constitutional:  Negative for activity change, chills, fatigue, fever, unexpected weight gain and unexpected weight loss.   HENT:  Negative for congestion, ear discharge, ear pain, mouth sores, postnasal drip, rhinorrhea, sinus pressure, sore throat, swollen glands and trouble swallowing.    Eyes:  Negative for blurred vision, pain, discharge, itching and visual disturbance.   Respiratory:  Positive for cough, shortness of breath and wheezing (intermittent). Negative for apnea, chest tightness and stridor.         Left lower lung pain with deep breaths   Cardiovascular:  Negative for chest pain, palpitations and leg swelling.   Gastrointestinal:  Negative for abdominal distention, abdominal pain, constipation, diarrhea, nausea, vomiting, GERD and indigestion.   Musculoskeletal:  Negative for arthralgias, joint swelling and myalgias.   Skin:  Negative for color change.   Neurological:  Positive for dizziness and light-headedness. Negative for weakness and headache.    Sleep: Negative for Excessive daytime sleepiness  Negative for morning headaches  Negative for Snoring      Family History   Problem Relation Age of Onset    Leukemia Mother     Leukemia Father         Social History     Socioeconomic History    Marital status: Single   Tobacco Use    Smoking status: Former     Packs/day: 1.50     Years: 36.00     Pack years: 54.00     Types: Cigarettes     Start date:      Quit date: 2015     Years since quittin.1     Passive exposure: Past    Smokeless tobacco: Never   Vaping Use    Vaping Use: Never used   Substance and Sexual Activity    Alcohol use: Not Currently     Comment: social    Drug use: Never    Sexual activity: Defer        Past  Medical History:   Diagnosis Date    Allergic rhinitis     Anemia     Anxiety     Depression     Diverticulitis     GERD (gastroesophageal reflux disease)     Mood disorder     Sleep apnea         Immunization History   Administered Date(s) Administered    COVID-19 (MODERNA) 1st,2nd,3rd Dose Monovalent 02/03/2021, 03/03/2021, 11/12/2021    COVID-19 (MODERNA) BIVALENT 12+YRS 10/26/2022    FluLaval/Fluzone >6mos 10/13/2022    Pneumococcal Polysaccharide (PPSV23) 03/11/2022       No Known Allergies       Current Outpatient Medications:     albuterol (PROVENTIL) (2.5 MG/3ML) 0.083% nebulizer solution, Take 2.5 mg by nebulization Every 4 (Four) Hours As Needed for Wheezing., Disp: , Rfl:     albuterol sulfate  (90 Base) MCG/ACT inhaler, Inhale 2 puffs Every 4 (Four) Hours As Needed., Disp: , Rfl:     atorvastatin (LIPITOR) 10 MG tablet, Take 1 tablet by mouth Daily., Disp: , Rfl:     busPIRone (BUSPAR) 10 MG tablet, Take 1 tablet by mouth 3 (Three) Times a Day., Disp: , Rfl:     diphenhydrAMINE (BENADRYL) 25 mg capsule, Take 1 capsule by mouth Daily., Disp: , Rfl:     DULoxetine (CYMBALTA) 60 MG capsule, Take 2 capsules by mouth Daily., Disp: , Rfl:     fluconazole (DIFLUCAN) 150 MG tablet, TAKE ONE TABLET BY MOUTH ONCE FOR 1 DOSE FOR YEAST INFECTION., Disp: , Rfl:     fluticasone (FLONASE) 50 MCG/ACT nasal spray, 2 sprays by Each Nare route Daily., Disp: , Rfl:     levocetirizine (XYZAL) 5 MG tablet, Take 1 tablet by mouth Daily., Disp: , Rfl:     levothyroxine (SYNTHROID, LEVOTHROID) 112 MCG tablet, Take 1 tablet by mouth Daily., Disp: , Rfl:     metoprolol succinate XL (TOPROL-XL) 25 MG 24 hr tablet, Take 1 tablet by mouth Daily., Disp: , Rfl:     montelukast (SINGULAIR) 10 MG tablet, Take 1 tablet by mouth Daily., Disp: , Rfl:     omeprazole (priLOSEC) 40 MG capsule, Take 1 capsule by mouth 2 (Two) Times a Day., Disp: , Rfl:     Umeclidinium-Vilanterol (ANORO ELLIPTA IN), Inhale., Disp: , Rfl:      "budesonide-formoterol (SYMBICORT) 80-4.5 MCG/ACT inhaler, Inhale 2 puffs 2 (Two) Times a Day. (Patient not taking: Reported on 7/24/2023), Disp: 1 each, Rfl: 11    predniSONE (DELTASONE) 10 MG tablet, Take 4 tabs daily x 3 days, then take 3 tabs daily x 3 days, then take 2 tabs daily x 3 days, then take 1 tab daily x 3 days, Disp: 31 tablet, Rfl: 0    tiotropium bromide monohydrate (Spiriva Respimat) 2.5 MCG/ACT aerosol solution inhaler, Inhale 2 puffs Daily. (Patient not taking: Reported on 7/24/2023), Disp: 1 each, Rfl: 11     Objective     Vital Signs:   /78 (BP Location: Left arm, Patient Position: Sitting, Cuff Size: Large Adult)   Pulse 110   Temp 97.7 °F (36.5 °C) (Tympanic)   Resp 18   Ht 154.9 cm (61\")   Wt 86.2 kg (190 lb)   SpO2 94% Comment: room air  BMI 35.90 kg/m²     Objective   Physical Exam  Constitutional:       General: She is not in acute distress.     Appearance: Normal appearance. She is obese. She is not ill-appearing.   HENT:      Right Ear: Tympanic membrane and ear canal normal.      Left Ear: Tympanic membrane and ear canal normal.      Nose: Nose normal.      Mouth/Throat:      Mouth: Mucous membranes are moist.      Pharynx: Oropharynx is clear.   Eyes:      Extraocular Movements: Extraocular movements intact.      Conjunctiva/sclera: Conjunctivae normal.      Pupils: Pupils are equal, round, and reactive to light.   Cardiovascular:      Rate and Rhythm: Regular rhythm. Tachycardia present.      Pulses: Normal pulses.      Heart sounds: Normal heart sounds.   Pulmonary:      Effort: Pulmonary effort is normal. No respiratory distress.      Breath sounds: No stridor. Rhonchi (Left lower lobe) present. No wheezing or rales.   Abdominal:      General: Bowel sounds are normal.      Palpations: Abdomen is soft.   Musculoskeletal:         General: No swelling. Normal range of motion.      Cervical back: Normal range of motion and neck supple.      Right lower leg: No edema.      " Left lower leg: No edema.   Skin:     General: Skin is warm and dry.   Neurological:      General: No focal deficit present.      Mental Status: She is alert and oriented to person, place, and time.      Motor: No weakness.   Psychiatric:         Mood and Affect: Mood normal.         Behavior: Behavior normal.      Result Review :   I have personally reviewed patient's labs and images.  I also reviewed my last office note 5/18/2023.       Diagnoses and all orders for this visit:    1. Chronic obstructive pulmonary disease, unspecified COPD type (Primary)    2. Mild intermittent asthma without complication    3. Shortness of breath  -     POCT SARS-CoV-2 Antigen ANIKET  -     POC Influenza A / B  -     XR Chest 2 View; Future    4. Chronic cough    5. Seasonal allergies    6. Obesity (BMI 30-39.9)    7. Tobacco abuse, in remission    8. Body aches  -     POCT SARS-CoV-2 Antigen ANIKET  -     POC Influenza A / B    9. COPD with acute exacerbation  -     predniSONE (DELTASONE) 10 MG tablet; Take 4 tabs daily x 3 days, then take 3 tabs daily x 3 days, then take 2 tabs daily x 3 days, then take 1 tab daily x 3 days  Dispense: 31 tablet; Refill: 0       Impression and Plan    -Alpha 1 antitrypsin genotype normal MM, 145 mg/dL  -PFTs 5/5/2023 showed severe obstructive airway disease, FEV1 49% of predicted, significant response to bronchodilator with 14% improvement in FEV1.  Air trapping present, but no hyperinflation, DLCO moderately reduced 54% of predicted.  -CT chest with contrast 5/10/2023 showed no evidence of PE, there were numerous new tiny nodular and groundglass opacities throughout the left lung concerning for multifocal pneumonia.  There were also advanced emphysematous changes.  -Influenza and COVID swabs negative today  -CXR today showed lingular and left lower lobe pneumonia versus aspiration, will prescribe doxycycline x 10 days  -Prednisone taper prescribed for possible COPD exacerbation/wheezing  -Continue  using Breztri daily as prescribed, reminded patient to rinse mouth after each use  -Continue using albuterol rescue inhaler or albuterol nebulized breathing treatments as needed  -Continue taking Singulair nightly and Benadryl and Flonase as needed for seasonal allergies  -Patient to keep regularly scheduled appointment with Dr. Colon 8/2/2023 to check in regarding acute symptoms, may return sooner if needed    Smoking status: Reviewed  Vaccination status: Patient reports she is up-to-date with her flu, pneumonia, and Covid vaccines.  Patient is advised to continue to follow CDC recommendations such as social distancing wearing a mask and washing hands for at least 20 seconds.  Medications personally reviewed    Follow Up   No follow-ups on file.  Patient was given instructions and counseling regarding her condition or for health maintenance advice. Please see specific information pulled into the AVS if appropriate.

## 2023-08-02 ENCOUNTER — OFFICE VISIT (OUTPATIENT)
Dept: PULMONOLOGY | Facility: CLINIC | Age: 58
End: 2023-08-02
Payer: COMMERCIAL

## 2023-08-02 ENCOUNTER — HOSPITAL ENCOUNTER (OUTPATIENT)
Dept: GENERAL RADIOLOGY | Facility: HOSPITAL | Age: 58
Discharge: HOME | End: 2023-08-02
Admitting: INTERNAL MEDICINE
Payer: COMMERCIAL

## 2023-08-02 VITALS
WEIGHT: 192.2 LBS | RESPIRATION RATE: 16 BRPM | HEART RATE: 91 BPM | HEIGHT: 61 IN | OXYGEN SATURATION: 96 % | BODY MASS INDEX: 36.29 KG/M2 | DIASTOLIC BLOOD PRESSURE: 82 MMHG | SYSTOLIC BLOOD PRESSURE: 131 MMHG

## 2023-08-02 DIAGNOSIS — J45.20 MILD INTERMITTENT ASTHMA WITHOUT COMPLICATION: ICD-10-CM

## 2023-08-02 DIAGNOSIS — J18.9 PNEUMONIA OF LEFT LOWER LOBE DUE TO INFECTIOUS ORGANISM: ICD-10-CM

## 2023-08-02 DIAGNOSIS — E66.01 MORBID (SEVERE) OBESITY DUE TO EXCESS CALORIES: ICD-10-CM

## 2023-08-02 DIAGNOSIS — J44.9 CHRONIC OBSTRUCTIVE PULMONARY DISEASE, UNSPECIFIED COPD TYPE: ICD-10-CM

## 2023-08-02 DIAGNOSIS — J44.9 CHRONIC OBSTRUCTIVE PULMONARY DISEASE, UNSPECIFIED COPD TYPE: Primary | ICD-10-CM

## 2023-08-02 PROCEDURE — 71046 X-RAY EXAM CHEST 2 VIEWS: CPT

## 2023-08-02 RX ORDER — FLUTICASONE PROPIONATE 50 MCG
2 SPRAY, SUSPENSION (ML) NASAL DAILY
Qty: 1 G | Refills: 5 | Status: SHIPPED | OUTPATIENT
Start: 2023-08-02

## 2023-08-25 ENCOUNTER — TELEPHONE (OUTPATIENT)
Dept: PULMONOLOGY | Facility: CLINIC | Age: 58
End: 2023-08-25

## 2023-08-25 NOTE — TELEPHONE ENCOUNTER
Caller: Anna Solorio    Relationship to patient: Self    Best call back number: [phone]    Patient is needing: PT NEEDING TO SPEAK TO MA ABOUT SYMPTOMS SHE'S EXPERIENCES AND NEEDS HELP UNDERSTANDING HER RESULTS FROM A CHEST XRAY

## 2023-08-25 NOTE — TELEPHONE ENCOUNTER
Patient called and stated that she is using her nebulizer a lot more, her asthma is really bad today. She is wanting to see Dr. Colon on Monday if at all possible.

## 2023-08-25 NOTE — TELEPHONE ENCOUNTER
Spoke with patient. Patient placed on schedule for Monday @ 0845. Advised patient is she has worsening symptoms to go to Urgent care for the Emergency dept. Patient verbalized understanding

## 2023-09-06 ENCOUNTER — TELEPHONE (OUTPATIENT)
Dept: PULMONOLOGY | Facility: CLINIC | Age: 58
End: 2023-09-06
Payer: COMMERCIAL

## 2023-09-06 NOTE — TELEPHONE ENCOUNTER
I wanted to make you aware patient called stating she was seen in urgent care last week and diagnosed with pneumonia. Patient has completed her antibiotics and feels like pneumonia still has not resolved. Patient was offered an appointment this morning but is not able to leave work early. Patient needs an afternoon appointment and agreed to come be seen tomorrow afternoon at 3:15 by Radha nEriquez since you have no availability tomorrow.

## 2023-09-06 NOTE — PROGRESS NOTES
Primary Care Provider  Moris Dia MD   Referring Provider  No ref. provider found    Patient Complaint  Follow-up, Shortness of Breath, and Wheezing      SUBJECTIVE    History of Presenting Illness  Anna Solorio is a pleasant 57 y.o. female patient of Dr. Knott who presents to [facility]IT[facility]ARE MEDICINE for acute visit.  Patient states [facility] and diagnosed with pneumonia.  Patient states she did have a chest x-ray done there that showed pneumonia.  Patient had completed antibiotics but feels like the pneumonia is still present and has not completely resolved.  Patient states she has had pneumonia since May 2023.  Patient states she continues to have discomfort on the left lower rib cage area.  She has a dry cough.  She was checked for COVID and flu and these were negative when she was at the urgent care.  She continues on Anoro albuterol inhaler and albuterol nebulizer.    She does have shortness of air with exertional activities but improves with rest.  Her cough is dry in nature.  She denies having wheezing, headaches, chest pain, weight loss or hemoptysis. Denies fevers, chills and night sweats. Anna Solorio is able to perform ADLs without difficulties and denies any swollen glands/lymph nodes in the head or neck.    I have personally reviewed the review of systems, past family, social, medical and surgical histories; and agree with their findings.    Review of Systems  Constitutional symptoms:  Denied complaints   Ear, nose, throat: Denied complaints  Cardiovascular:  Denied complaints  Respiratory: Shortness of air with exertion, dry cough  Gastrointestinal: Denied complaints  Musculoskeletal: Denied complaints    Family History   Problem Relation Age of Onset    Leukemia Mother     Leukemia Father         Social History     Socioeconomic History    Marital status: Single   Tobacco Use    Smoking status: Former      Packs/day: 1.50     Years: 36.00     Pack years: 54.00     Types: Cigarettes     Start date:      Quit date: 2015     Years since quittin.2     Passive exposure: Past    Smokeless tobacco: Never   Vaping Use    Vaping Use: Never used   Substance and Sexual Activity    Alcohol use: Not Currently     Comment: social    Drug use: Never    Sexual activity: Defer        Past Medical History:   Diagnosis Date    Allergic rhinitis     Anemia     Anxiety     Depression     Diverticulitis     GERD (gastroesophageal reflux disease)     Mood disorder     Sleep apnea         Immunization History   Administered Date(s) Administered    COVID-19 (MODERNA) 1st,2nd,3rd Dose Monovalent 2021, 2021, 2021    COVID-19 (MODERNA) BIVALENT 12+YRS 10/26/2022    Fluzone >6mos 10/13/2022    Pneumococcal Polysaccharide (PPSV23) 2022       No Known Allergies       Current Outpatient Medications:     albuterol (PROVENTIL) (2.5 MG/3ML) 0.083% nebulizer solution, Take 2.5 mg by nebulization Every 4 (Four) Hours As Needed for Wheezing., Disp: , Rfl:     albuterol sulfate  (90 Base) MCG/ACT inhaler, Inhale 2 puffs Every 4 (Four) Hours As Needed., Disp: , Rfl:     atorvastatin (LIPITOR) 10 MG tablet, Take 1 tablet by mouth Daily., Disp: , Rfl:     busPIRone (BUSPAR) 10 MG tablet, Take 1 tablet by mouth 3 (Three) Times a Day., Disp: , Rfl:     DULoxetine (CYMBALTA) 60 MG capsule, Take 2 capsules by mouth Daily., Disp: , Rfl:     fluconazole (DIFLUCAN) 150 MG tablet, Take 1 tablet by mouth 1 (One) Time for 1 dose., Disp: 1 tablet, Rfl: 0    fluticasone (FLONASE) 50 MCG/ACT nasal spray, 2 sprays by Each Nare route Daily., Disp: 1 g, Rfl: 5    levocetirizine (XYZAL) 5 MG tablet, Take 1 tablet by mouth Daily., Disp: , Rfl:     levothyroxine (SYNTHROID, LEVOTHROID) 112 MCG tablet, Take 1 tablet by mouth Daily., Disp: , Rfl:     metoprolol succinate XL (TOPROL-XL) 25 MG 24 hr tablet, Take 1 tablet by mouth  "Daily., Disp: , Rfl:     montelukast (SINGULAIR) 10 MG tablet, Take 1 tablet by mouth Daily., Disp: , Rfl:     omeprazole (priLOSEC) 40 MG capsule, Take 1 capsule by mouth 2 (Two) Times a Day., Disp: , Rfl:     Umeclidinium-Vilanterol (ANORO ELLIPTA IN), Inhale., Disp: , Rfl:            Vital Signs   /95 (BP Location: Right arm, Patient Position: Sitting, Cuff Size: Adult)   Pulse 102   Temp 97.2 °F (36.2 °C) (Temporal)   Resp 18   Ht 154.9 cm (61\")   Wt 86.1 kg (189 lb 14.4 oz)   SpO2 100% Comment: room air  BMI 35.88 kg/m²       OBJECTIVE    Physical Exam  Vital Signs Reviewed   WDWN, Alert, NAD.    HEENT:  PERRL, EOMI.  OP, nares clear  Neck:  Supple, no JVD, no thyromegaly  Chest:  good aeration, clear to auscultation bilaterally, tympanic to percussion bilaterally, no work of breathing noted  CV: RRR, no MGR, pulses 2+, equal.  Abd:  Soft, NT, ND, + BS, no HSM  EXT:  no clubbing, no cyanosis, no edema  Neuro:  A&Ox3, CN grossly intact, no focal deficits.  Skin: No rashes or lesions noted    Results Review  I have personally reviewed the prior office notes, hospital records, labs, and diagnostics.    ASSESSMENT         Patient Active Problem List   Diagnosis    Arm mass    Shortness of breath    COPD with acute exacerbation       Encounter Diagnoses   Name Primary?    Shortness of breath Yes    Chronic cough     Yeast infection       PLAN  Discussed with patient obtaining chest x-ray, CT scan respiratory culture and some blood work today.  Patient is agreeable to getting the chest x-ray at this time as well as the blood test.  Patient states she has a dry cough and does not think she can produce a sputum specimen. Will notify patient of chest x-ray results and CBC when available.  Discussed with patient obtaining a CT scan of her lungs however patient would like to hold off on at this time pending chest x-ray result.  Patient states she does have a yeast infection we will send in Diflucan for her at " this time.  Patient to continue with Anoro and albuterol as prescribed.  I also discussed with patient possible bronchoscopy, we will revisit this at her next appointment if cough is persistent.  Diagnoses and all orders for this visit:    1. Shortness of breath (Primary)  -     XR Chest 2 View; Future  -     CBC & Differential; Future    2. Chronic cough  -     XR Chest 2 View; Future  -     CBC & Differential; Future    3. Yeast infection  -     fluconazole (DIFLUCAN) 150 MG tablet; Take 1 tablet by mouth 1 (One) Time for 1 dose.  Dispense: 1 tablet; Refill: 0           Smoking status:former  Vaccination status:up to date  Medications personally reviewed    Follow Up  Return for Next scheduled follow up.    Patient was given instructions and counseling regarding her condition or for health maintenance advice. Please see specific information pulled into the AVS if appropriate.

## 2023-09-07 ENCOUNTER — LAB (OUTPATIENT)
Dept: LAB | Facility: HOSPITAL | Age: 58
End: 2023-09-07
Payer: COMMERCIAL

## 2023-09-07 ENCOUNTER — OFFICE VISIT (OUTPATIENT)
Dept: PULMONOLOGY | Facility: CLINIC | Age: 58
End: 2023-09-07
Payer: COMMERCIAL

## 2023-09-07 ENCOUNTER — HOSPITAL ENCOUNTER (OUTPATIENT)
Dept: GENERAL RADIOLOGY | Facility: HOSPITAL | Age: 58
Discharge: HOME | End: 2023-09-07
Payer: COMMERCIAL

## 2023-09-07 VITALS
TEMPERATURE: 97.2 F | OXYGEN SATURATION: 100 % | HEART RATE: 102 BPM | BODY MASS INDEX: 35.85 KG/M2 | DIASTOLIC BLOOD PRESSURE: 95 MMHG | RESPIRATION RATE: 18 BRPM | SYSTOLIC BLOOD PRESSURE: 149 MMHG | WEIGHT: 189.9 LBS | HEIGHT: 61 IN

## 2023-09-07 DIAGNOSIS — R06.02 SHORTNESS OF BREATH: ICD-10-CM

## 2023-09-07 DIAGNOSIS — R05.3 CHRONIC COUGH: ICD-10-CM

## 2023-09-07 DIAGNOSIS — B37.9 YEAST INFECTION: ICD-10-CM

## 2023-09-07 DIAGNOSIS — J18.9 PNEUMONIA OF LEFT LOWER LOBE DUE TO INFECTIOUS ORGANISM: Primary | ICD-10-CM

## 2023-09-07 DIAGNOSIS — R06.02 SHORTNESS OF BREATH: Primary | ICD-10-CM

## 2023-09-07 LAB
BASOPHILS # BLD AUTO: 0.09 10*3/MM3 (ref 0–0.2)
BASOPHILS NFR BLD AUTO: 0.9 % (ref 0–1.5)
DEPRECATED RDW RBC AUTO: 40.1 FL (ref 37–54)
EOSINOPHIL # BLD AUTO: 0.13 10*3/MM3 (ref 0–0.4)
EOSINOPHIL NFR BLD AUTO: 1.3 % (ref 0.3–6.2)
ERYTHROCYTE [DISTWIDTH] IN BLOOD BY AUTOMATED COUNT: 13.4 % (ref 12.3–15.4)
HCT VFR BLD AUTO: 38.7 % (ref 34–46.6)
HGB BLD-MCNC: 12.7 G/DL (ref 12–15.9)
IMM GRANULOCYTES # BLD AUTO: 0.05 10*3/MM3 (ref 0–0.05)
IMM GRANULOCYTES NFR BLD AUTO: 0.5 % (ref 0–0.5)
LYMPHOCYTES # BLD AUTO: 3.23 10*3/MM3 (ref 0.7–3.1)
LYMPHOCYTES NFR BLD AUTO: 32.7 % (ref 19.6–45.3)
MCH RBC QN AUTO: 27 PG (ref 26.6–33)
MCHC RBC AUTO-ENTMCNC: 32.8 G/DL (ref 31.5–35.7)
MCV RBC AUTO: 82.2 FL (ref 79–97)
MONOCYTES # BLD AUTO: 0.52 10*3/MM3 (ref 0.1–0.9)
MONOCYTES NFR BLD AUTO: 5.3 % (ref 5–12)
NEUTROPHILS NFR BLD AUTO: 5.85 10*3/MM3 (ref 1.7–7)
NEUTROPHILS NFR BLD AUTO: 59.3 % (ref 42.7–76)
NRBC BLD AUTO-RTO: 0 /100 WBC (ref 0–0.2)
PLATELET # BLD AUTO: 479 10*3/MM3 (ref 140–450)
PMV BLD AUTO: 10 FL (ref 6–12)
RBC # BLD AUTO: 4.71 10*6/MM3 (ref 3.77–5.28)
WBC NRBC COR # BLD: 9.87 10*3/MM3 (ref 3.4–10.8)

## 2023-09-07 PROCEDURE — 99214 OFFICE O/P EST MOD 30 MIN: CPT | Performed by: NURSE PRACTITIONER

## 2023-09-07 PROCEDURE — 36415 COLL VENOUS BLD VENIPUNCTURE: CPT

## 2023-09-07 PROCEDURE — 71046 X-RAY EXAM CHEST 2 VIEWS: CPT

## 2023-09-07 PROCEDURE — 85025 COMPLETE CBC W/AUTO DIFF WBC: CPT

## 2023-09-07 RX ORDER — AZITHROMYCIN 250 MG/1
TABLET, FILM COATED ORAL
COMMUNITY
Start: 2023-08-25 | End: 2023-09-07

## 2023-09-07 RX ORDER — AMOXICILLIN AND CLAVULANATE POTASSIUM 875; 125 MG/1; MG/1
1 TABLET, FILM COATED ORAL EVERY 12 HOURS SCHEDULED
COMMUNITY
Start: 2023-08-25 | End: 2023-09-07

## 2023-09-07 RX ORDER — FLUCONAZOLE 150 MG/1
150 TABLET ORAL ONCE
Qty: 1 TABLET | Refills: 0 | Status: SHIPPED | OUTPATIENT
Start: 2023-09-07 | End: 2023-09-07

## 2023-09-07 RX ORDER — LEVOFLOXACIN 750 MG/1
750 TABLET ORAL DAILY
Qty: 5 TABLET | Refills: 0 | Status: SHIPPED | OUTPATIENT
Start: 2023-09-07

## 2023-09-20 ENCOUNTER — HOSPITAL ENCOUNTER (OUTPATIENT)
Dept: CT IMAGING | Facility: HOSPITAL | Age: 58
Discharge: HOME | End: 2023-09-20
Admitting: NURSE PRACTITIONER
Payer: COMMERCIAL

## 2023-09-20 DIAGNOSIS — J18.9 PNEUMONIA OF LEFT LOWER LOBE DUE TO INFECTIOUS ORGANISM: ICD-10-CM

## 2023-09-20 PROCEDURE — 71250 CT THORAX DX C-: CPT

## 2023-09-27 ENCOUNTER — OFFICE VISIT (OUTPATIENT)
Dept: PULMONOLOGY | Facility: CLINIC | Age: 58
End: 2023-09-27
Payer: COMMERCIAL

## 2023-09-27 VITALS
BODY MASS INDEX: 35.74 KG/M2 | DIASTOLIC BLOOD PRESSURE: 73 MMHG | HEART RATE: 92 BPM | RESPIRATION RATE: 16 BRPM | HEIGHT: 61 IN | OXYGEN SATURATION: 100 % | SYSTOLIC BLOOD PRESSURE: 124 MMHG | WEIGHT: 189.3 LBS

## 2023-09-27 DIAGNOSIS — R91.8 MULTIPLE LUNG NODULES: ICD-10-CM

## 2023-09-27 DIAGNOSIS — E66.9 OBESITY (BMI 30-39.9): ICD-10-CM

## 2023-09-27 DIAGNOSIS — K44.9 HIATAL HERNIA: Primary | ICD-10-CM

## 2023-09-27 DIAGNOSIS — F17.201 TOBACCO ABUSE, IN REMISSION: ICD-10-CM

## 2023-09-27 DIAGNOSIS — J69.0 ASPIRATION PNEUMONIA OF BOTH LOWER LOBES DUE TO VOMIT: ICD-10-CM

## 2023-09-27 RX ORDER — POTASSIUM CHLORIDE 750 MG/1
2 CAPSULE, EXTENDED RELEASE ORAL DAILY
COMMUNITY
Start: 2023-09-22

## 2023-09-27 NOTE — PROGRESS NOTES
Primary Care Provider  Moris Dia MD   Referring Provider  No ref. provider found    Patient Complaint  Lung Nodule, Shortness of Breath (With exertion ), Follow-up, Cough (At night ), Chest Tightness (More in lung area with deep breath ), and Results (Ct )      SUBJECTIVE    History of Presenting Illness  Anna Solorio is a pleasant 57 y.o. female patient who presents to nodule clinic today.  Had a new left lower lobe lung nodule in May.  Does have some uptake on PET/CT.  Most recent patient has a very large hiatal hernia with most of her stomach up in her chest.  She reports significant heartburn and recurrent issues with aspiration.  States that she saw a surgeon in [facility] a number years ago who told her that chest CT just shows stable 1.6 cm left lower lobe nodule however has developed worsening tree-in-bud opacities and infiltrates throughout both lungs.  The surgery usually does not lead to technical success.  Patient reports increasing shortness of breath, productive cough with yellow sputum, weakness and fatigue.  She is a former cigarette smoker.  She is tolerating her current inhaler and nebulizer regimen.      Family History   Problem Relation Age of Onset    Leukemia Mother     Leukemia Father         Social History     Socioeconomic History    Marital status: Single   Tobacco Use    Smoking status: Former     Packs/day: 1.50     Years: 36.00     Pack years: 54.00     Types: Cigarettes     Start date:      Quit date: 2015     Years since quittin.3     Passive exposure: Past    Smokeless tobacco: Never   Vaping Use    Vaping Use: Never used   Substance and Sexual Activity    Alcohol use: Not Currently     Comment: social    Drug use: Never    Sexual activity: Defer        Past Medical History:   Diagnosis Date    Allergic rhinitis     Anemia     Anxiety     Depression     Diverticulitis     GERD (gastroesophageal reflux disease)     Mood disorder     Sleep apnea      "    Immunization History   Administered Date(s) Administered    COVID-19 (MODERNA) 1st,2nd,3rd Dose Monovalent 02/03/2021, 03/03/2021, 11/12/2021    COVID-19 (MODERNA) BIVALENT 12+YRS 10/26/2022    Fluzone (or Fluarix & Flulaval for VFC) >6mos 10/13/2022    Pneumococcal Polysaccharide (PPSV23) 03/11/2022       No Known Allergies       Current Outpatient Medications:     albuterol (PROVENTIL) (2.5 MG/3ML) 0.083% nebulizer solution, Take 2.5 mg by nebulization Every 4 (Four) Hours As Needed for Wheezing., Disp: , Rfl:     albuterol sulfate  (90 Base) MCG/ACT inhaler, Inhale 2 puffs Every 4 (Four) Hours As Needed., Disp: , Rfl:     atorvastatin (LIPITOR) 10 MG tablet, Take 1 tablet by mouth Daily., Disp: , Rfl:     busPIRone (BUSPAR) 10 MG tablet, Take 1 tablet by mouth 3 (Three) Times a Day., Disp: , Rfl:     DULoxetine (CYMBALTA) 60 MG capsule, Take 2 capsules by mouth Daily., Disp: , Rfl:     fluticasone (FLONASE) 50 MCG/ACT nasal spray, 2 sprays by Each Nare route Daily., Disp: 1 g, Rfl: 5    levocetirizine (XYZAL) 5 MG tablet, Take 1 tablet by mouth Daily., Disp: , Rfl:     levothyroxine (SYNTHROID, LEVOTHROID) 112 MCG tablet, Take 1 tablet by mouth Daily., Disp: , Rfl:     metoprolol succinate XL (TOPROL-XL) 25 MG 24 hr tablet, Take 1 tablet by mouth Daily., Disp: , Rfl:     montelukast (SINGULAIR) 10 MG tablet, Take 1 tablet by mouth Daily., Disp: , Rfl:     omeprazole (priLOSEC) 40 MG capsule, Take 1 capsule by mouth 2 (Two) Times a Day., Disp: , Rfl:     potassium chloride (MICRO-K) 10 MEQ CR capsule, Take 2 capsules by mouth Daily., Disp: , Rfl:     Umeclidinium-Vilanterol (ANORO ELLIPTA IN), Inhale., Disp: , Rfl:     levoFLOXacin (Levaquin) 750 MG tablet, Take 1 tablet by mouth Daily., Disp: 5 tablet, Rfl: 0           Vital Signs   /73 (BP Location: Left arm, Patient Position: Sitting, Cuff Size: Large Adult)   Pulse 92   Resp 16   Ht 154.9 cm (61\")   Wt 85.9 kg (189 lb 4.8 oz)   SpO2 100% " Comment: Room air  BMI 35.77 kg/m²       OBJECTIVE    Physical Exam  Vital Signs Reviewed   WDWN, Alert, NAD.    HEENT:  PERRL, EOMI.  OP, nares clear  Neck:  Supple, no JVD, no thyromegaly  Chest:  good aeration, coarse crackles and rhonchi bilaterally, tympanic to percussion bilaterally, no work of breathing noted  CV: RRR, no MGR, pulses 2+, equal.  Abd:  Soft, NT, ND, + BS, no HSM  EXT:  no clubbing, no cyanosis, no edema  Neuro:  A&Ox3, CN grossly intact, no focal deficits.  Skin: No rashes or lesions noted    Results Review  I personally reviewed last office notes.  I personally reviewed May chest CT showing new lung nodule in the left lower lobe.  PET scan did show some uptake in that nodule.  Most recent chest CT shows stable left lower lobe lung nodule but worsening multifocal lung infiltrates and airspace disease.  PFTs were personally reviewed showing airflow obstruction.  Also large hiatal hernia noticed on imaging.      ASSESSMENT     Patient Active Problem List   Diagnosis    Arm mass    Shortness of breath    COPD with acute exacerbation       Encounter Diagnoses   Name Primary?    Hiatal hernia Yes    Aspiration pneumonia of both lower lobes due to vomit     Multiple lung nodules     Tobacco abuse, in remission     Obesity (BMI 30-39.9)      Diagnoses and all orders for this visit:    1. Hiatal hernia (Primary)  -     Ambulatory Referral to Thoracic Surgery  -     NodifyLung; Future    2. Aspiration pneumonia of both lower lobes due to vomit  -     Ambulatory Referral to Thoracic Surgery  -     NodifyLung; Future    3. Multiple lung nodules  -     Ambulatory Referral to Thoracic Surgery  -     NodifyLung; Future  -     Case Request; Standing  -     Follow Anesthesia Guidlines / Standing Orders; Standing  -     Obtain Informed Consent; Standing  -     Case Request    4. Tobacco abuse, in remission  -     Ambulatory Referral to Thoracic Surgery  -     NodifyLung; Future    5. Obesity (BMI 30-39.9)        Plan:  I suspect this patient's chest CT findings are due to chronic infection and recurrent aspiration.  Especially in the setting of what I find to be a rather large hiatal hernia on chest imaging.  We will continue PPI for now.  I have referred patient to Dr. Serafin Kimble for evaluation for hiatal hernia pair.  Appreciate his assistance  Given the abnormal pattern on the chest CT coupled with a lung nodule and this former smoking patient, I think it would be reasonable to proceed with biopsy as well as obtain tissue from the left lower lobe nodule to rule out malignancy she is a former smoker  Take patient for robotic bronchoscopy with FNA, biopsies, brushings and endobronchial ultrasound/fine-needle aspiration  I have discussed the risks of the procedure with the patient including pneumothorax, hemothorax, bleeding, hypoxia, required mechanical ventilation and death. The patient recognizes these findings, acknowledges these findings and is agreeable to the procedure.  We will check nodifylung blood tests to help assess malignancy risk  Continue current inhaler and nebulizer regimen  Diet and exercise counseling provided today.  Recommended 30 minutes daily exercise well is an 1800 -calorie/day diet.  Patient verbalized understanding.  Total time counseling the patient today was 3 minutes  Smoking status:former  Vaccination status: up to date with flu, Prevnar, COVID-19 vaccines  Medications personally reviewed    Follow Up  Return in about 6 weeks (around 11/8/2023).    Patient was given instructions and counseling regarding her condition or for health maintenance advice. Please see specific information pulled into the AVS if appropriate.     Electronically signed by Lewis Mix MD, 09/27/23, 3:58 PM EDT.

## 2023-09-27 NOTE — H&P (VIEW-ONLY)
Primary Care Provider  Moris Dia MD   Referring Provider  No ref. provider found    Patient Complaint  Lung Nodule, Shortness of Breath (With exertion ), Follow-up, Cough (At night ), Chest Tightness (More in lung area with deep breath ), and Results (Ct )      SUBJECTIVE    History of Presenting Illness  Anna Solorio is a pleasant 57 y.o. female patient who presents to nodule clinic today.  Had a new left lower lobe lung nodule in May.  Does have some uptake on PET/CT.  Most recent patient has a very large hiatal hernia with most of her stomach up in her chest.  She reports significant heartburn and recurrent issues with aspiration.  States that she saw a surgeon in [facility] a number years ago who told her that chest CT just shows stable 1.6 cm left lower lobe nodule however has developed worsening tree-in-bud opacities and infiltrates throughout both lungs.  The surgery usually does not lead to technical success.  Patient reports increasing shortness of breath, productive cough with yellow sputum, weakness and fatigue.  She is a former cigarette smoker.  She is tolerating her current inhaler and nebulizer regimen.      Family History   Problem Relation Age of Onset    Leukemia Mother     Leukemia Father         Social History     Socioeconomic History    Marital status: Single   Tobacco Use    Smoking status: Former     Packs/day: 1.50     Years: 36.00     Pack years: 54.00     Types: Cigarettes     Start date:      Quit date: 2015     Years since quittin.3     Passive exposure: Past    Smokeless tobacco: Never   Vaping Use    Vaping Use: Never used   Substance and Sexual Activity    Alcohol use: Not Currently     Comment: social    Drug use: Never    Sexual activity: Defer        Past Medical History:   Diagnosis Date    Allergic rhinitis     Anemia     Anxiety     Depression     Diverticulitis     GERD (gastroesophageal reflux disease)     Mood disorder     Sleep apnea      "    Immunization History   Administered Date(s) Administered    COVID-19 (MODERNA) 1st,2nd,3rd Dose Monovalent 02/03/2021, 03/03/2021, 11/12/2021    COVID-19 (MODERNA) BIVALENT 12+YRS 10/26/2022    Fluzone (or Fluarix & Flulaval for VFC) >6mos 10/13/2022    Pneumococcal Polysaccharide (PPSV23) 03/11/2022       No Known Allergies       Current Outpatient Medications:     albuterol (PROVENTIL) (2.5 MG/3ML) 0.083% nebulizer solution, Take 2.5 mg by nebulization Every 4 (Four) Hours As Needed for Wheezing., Disp: , Rfl:     albuterol sulfate  (90 Base) MCG/ACT inhaler, Inhale 2 puffs Every 4 (Four) Hours As Needed., Disp: , Rfl:     atorvastatin (LIPITOR) 10 MG tablet, Take 1 tablet by mouth Daily., Disp: , Rfl:     busPIRone (BUSPAR) 10 MG tablet, Take 1 tablet by mouth 3 (Three) Times a Day., Disp: , Rfl:     DULoxetine (CYMBALTA) 60 MG capsule, Take 2 capsules by mouth Daily., Disp: , Rfl:     fluticasone (FLONASE) 50 MCG/ACT nasal spray, 2 sprays by Each Nare route Daily., Disp: 1 g, Rfl: 5    levocetirizine (XYZAL) 5 MG tablet, Take 1 tablet by mouth Daily., Disp: , Rfl:     levothyroxine (SYNTHROID, LEVOTHROID) 112 MCG tablet, Take 1 tablet by mouth Daily., Disp: , Rfl:     metoprolol succinate XL (TOPROL-XL) 25 MG 24 hr tablet, Take 1 tablet by mouth Daily., Disp: , Rfl:     montelukast (SINGULAIR) 10 MG tablet, Take 1 tablet by mouth Daily., Disp: , Rfl:     omeprazole (priLOSEC) 40 MG capsule, Take 1 capsule by mouth 2 (Two) Times a Day., Disp: , Rfl:     potassium chloride (MICRO-K) 10 MEQ CR capsule, Take 2 capsules by mouth Daily., Disp: , Rfl:     Umeclidinium-Vilanterol (ANORO ELLIPTA IN), Inhale., Disp: , Rfl:     levoFLOXacin (Levaquin) 750 MG tablet, Take 1 tablet by mouth Daily., Disp: 5 tablet, Rfl: 0           Vital Signs   /73 (BP Location: Left arm, Patient Position: Sitting, Cuff Size: Large Adult)   Pulse 92   Resp 16   Ht 154.9 cm (61\")   Wt 85.9 kg (189 lb 4.8 oz)   SpO2 100% " Comment: Room air  BMI 35.77 kg/m²       OBJECTIVE    Physical Exam  Vital Signs Reviewed   WDWN, Alert, NAD.    HEENT:  PERRL, EOMI.  OP, nares clear  Neck:  Supple, no JVD, no thyromegaly  Chest:  good aeration, coarse crackles and rhonchi bilaterally, tympanic to percussion bilaterally, no work of breathing noted  CV: RRR, no MGR, pulses 2+, equal.  Abd:  Soft, NT, ND, + BS, no HSM  EXT:  no clubbing, no cyanosis, no edema  Neuro:  A&Ox3, CN grossly intact, no focal deficits.  Skin: No rashes or lesions noted    Results Review  I personally reviewed last office notes.  I personally reviewed May chest CT showing new lung nodule in the left lower lobe.  PET scan did show some uptake in that nodule.  Most recent chest CT shows stable left lower lobe lung nodule but worsening multifocal lung infiltrates and airspace disease.  PFTs were personally reviewed showing airflow obstruction.  Also large hiatal hernia noticed on imaging.      ASSESSMENT     Patient Active Problem List   Diagnosis    Arm mass    Shortness of breath    COPD with acute exacerbation       Encounter Diagnoses   Name Primary?    Hiatal hernia Yes    Aspiration pneumonia of both lower lobes due to vomit     Multiple lung nodules     Tobacco abuse, in remission     Obesity (BMI 30-39.9)      Diagnoses and all orders for this visit:    1. Hiatal hernia (Primary)  -     Ambulatory Referral to Thoracic Surgery  -     NodifyLung; Future    2. Aspiration pneumonia of both lower lobes due to vomit  -     Ambulatory Referral to Thoracic Surgery  -     NodifyLung; Future    3. Multiple lung nodules  -     Ambulatory Referral to Thoracic Surgery  -     NodifyLung; Future  -     Case Request; Standing  -     Follow Anesthesia Guidlines / Standing Orders; Standing  -     Obtain Informed Consent; Standing  -     Case Request    4. Tobacco abuse, in remission  -     Ambulatory Referral to Thoracic Surgery  -     NodifyLung; Future    5. Obesity (BMI 30-39.9)        Plan:  I suspect this patient's chest CT findings are due to chronic infection and recurrent aspiration.  Especially in the setting of what I find to be a rather large hiatal hernia on chest imaging.  We will continue PPI for now.  I have referred patient to Dr. Serafin Kimble for evaluation for hiatal hernia pair.  Appreciate his assistance  Given the abnormal pattern on the chest CT coupled with a lung nodule and this former smoking patient, I think it would be reasonable to proceed with biopsy as well as obtain tissue from the left lower lobe nodule to rule out malignancy she is a former smoker  Take patient for robotic bronchoscopy with FNA, biopsies, brushings and endobronchial ultrasound/fine-needle aspiration  I have discussed the risks of the procedure with the patient including pneumothorax, hemothorax, bleeding, hypoxia, required mechanical ventilation and death. The patient recognizes these findings, acknowledges these findings and is agreeable to the procedure.  We will check nodifylung blood tests to help assess malignancy risk  Continue current inhaler and nebulizer regimen  Diet and exercise counseling provided today.  Recommended 30 minutes daily exercise well is an 1800 -calorie/day diet.  Patient verbalized understanding.  Total time counseling the patient today was 3 minutes  Smoking status:former  Vaccination status: up to date with flu, Prevnar, COVID-19 vaccines  Medications personally reviewed    Follow Up  Return in about 6 weeks (around 11/8/2023).    Patient was given instructions and counseling regarding her condition or for health maintenance advice. Please see specific information pulled into the AVS if appropriate.     Electronically signed by Lewis Mix MD, 09/27/23, 3:58 PM EDT.

## 2023-10-03 PROBLEM — R91.8 MULTIPLE LUNG NODULES: Status: ACTIVE | Noted: 2023-10-03

## 2023-10-12 DIAGNOSIS — R91.8 LUNG MASS: Primary | ICD-10-CM

## 2023-10-13 ENCOUNTER — HOSPITAL ENCOUNTER (OUTPATIENT)
Dept: CT IMAGING | Facility: HOSPITAL | Age: 58
Discharge: HOME | End: 2023-10-13
Admitting: NURSE PRACTITIONER
Payer: COMMERCIAL

## 2023-10-13 ENCOUNTER — TELEPHONE (OUTPATIENT)
Dept: PULMONOLOGY | Facility: CLINIC | Age: 58
End: 2023-10-13

## 2023-10-13 DIAGNOSIS — R91.8 LUNG MASS: ICD-10-CM

## 2023-10-13 PROCEDURE — 71250 CT THORAX DX C-: CPT

## 2023-10-13 RX ORDER — FERROUS SULFATE 325(65) MG
325 TABLET ORAL
COMMUNITY

## 2023-10-13 NOTE — TELEPHONE ENCOUNTER
Hub staff attempted to follow warm transfer process and was unsuccessful     Caller: ABBIE    Relationship to patient: Provider    Best call back number: [phone]    Patient is needing: CASE # [id number] PHONE # [phone]  AUTH FOR STAT CT DENIED, NEEDS PEER TO PEER.

## 2023-10-13 NOTE — PRE-PROCEDURE INSTRUCTIONS
PATIENT INSTRUCTED TO BE:    - NPO AFTER MIDNIGHT EXCEPT CAN HAVE CLEAR LIQUIDS 2 HOURS PRIOR TO SURGERY ARRIVAL TIME     - TO HOLD ALL VITAMINS, SUPPLEMENTS, NSAIDS FOR ONE WEEK PRIOR TO THEIR SURGICAL PROCEDURE    - INSTRUCTED PT TO USE SURGICAL SOAP 1 TIME THE NIGHT PRIOR TO SURGERY OR THE AM OF SURGERY.   USE SOAP FROM NECK TO TOES AVOID THEIR FACE, HAIR, AND PRIVATE PARTS. INSTRUCTED NO LOTIONS, JEWELRY, PIERCINGS, OR DEODORANT DAY OF SURGERY    - IF DIABETIC, CHECK BLOOD GLUCOSE IF LESS THAN 70 CALL PREOP AREA -AM OF SURGERY     - INSTRUCTED TO TAKE THE FOLLOWING MEDICATIONS THE DAY OF SURGERY:     INHALERS/ NEBS PRN, PRILOSEC, SINGULAR, SYNTHROID, METOPROLOL, XYZAL, FLONASE, CYMBALTA, BUSPAR, LIPITOR, IRON    BRING INHALERS DAY OF SURGERY    PATIENT VERBALIZED UNDERSTANDING

## 2023-10-16 ENCOUNTER — HOSPITAL ENCOUNTER (OUTPATIENT)
Facility: HOSPITAL | Age: 58
Setting detail: HOSPITAL OUTPATIENT SURGERY
Discharge: HOME | End: 2023-10-16
Attending: INTERNAL MEDICINE | Admitting: INTERNAL MEDICINE
Payer: COMMERCIAL

## 2023-10-16 ENCOUNTER — APPOINTMENT (OUTPATIENT)
Dept: GENERAL RADIOLOGY | Facility: HOSPITAL | Age: 58
End: 2023-10-16
Payer: COMMERCIAL

## 2023-10-16 ENCOUNTER — ANESTHESIA (OUTPATIENT)
Dept: PERIOP | Facility: HOSPITAL | Age: 58
End: 2023-10-16
Payer: COMMERCIAL

## 2023-10-16 ENCOUNTER — ANESTHESIA EVENT (OUTPATIENT)
Dept: PERIOP | Facility: HOSPITAL | Age: 58
End: 2023-10-16
Payer: COMMERCIAL

## 2023-10-16 VITALS
SYSTOLIC BLOOD PRESSURE: 124 MMHG | TEMPERATURE: 96.9 F | OXYGEN SATURATION: 93 % | BODY MASS INDEX: 35.67 KG/M2 | HEART RATE: 90 BPM | WEIGHT: 188.93 LBS | HEIGHT: 61 IN | RESPIRATION RATE: 16 BRPM | DIASTOLIC BLOOD PRESSURE: 78 MMHG

## 2023-10-16 DIAGNOSIS — R91.8 MULTIPLE LUNG NODULES: ICD-10-CM

## 2023-10-16 LAB
ACB CMPLX DNA BAL NAA+NON-PRB-NCNCRNG: NOT DETECTED
BLACTX-M ISLT/SPM QL: NORMAL
BLAIMP ISLT/SPM QL: NORMAL
BLAKPC ISLT/SPM QL: NORMAL
BLAOXA-48-LIKE ISLT/SPM QL: NORMAL
BLAVIM ISLT/SPM QL: NORMAL
C PNEUM DNA NPH QL NAA+NON-PROBE: NOT DETECTED
CILIATED BAL QL: 22 %
E CLOAC COMP DNA BAL NAA+NON-PRB-NCNCRNG: NOT DETECTED
E COLI DNA BAL NAA+NON-PRB-NCNCRNG: NOT DETECTED
FLUAV SUBTYP SPEC NAA+PROBE: NOT DETECTED
FLUBV RNA ISLT QL NAA+PROBE: NOT DETECTED
GP B STREP DNA BAL NAA+NON-PRB-NCNCRNG: NOT DETECTED
GRAM STN SPEC: NORMAL
GRAM STN SPEC: NORMAL
HADV DNA SPEC NAA+PROBE: NOT DETECTED
HAEM INFLU DNA BAL NAA+NON-PRB-NCNCRNG: NOT DETECTED
HCOV RNA LOWER RESP QL NAA+NON-PROBE: NOT DETECTED
HMPV RNA NPH QL NAA+NON-PROBE: NOT DETECTED
HPIV RNA LOWER RESP QL NAA+NON-PROBE: NOT DETECTED
K AEROGENES DNA BAL NAA+NON-PRB-NCNCRNG: NOT DETECTED
K OXYTOCA DNA BAL NAA+NON-PRB-NCNCRNG: NOT DETECTED
K PNEU GRP DNA BAL NAA+NON-PRB-NCNCRNG: NOT DETECTED
L PNEUMO DNA LOWER RESP QL NAA+NON-PROBE: NOT DETECTED
LYMPHOCYTES NFR FLD MANUAL: 16 %
M CATARRHALIS DNA BAL NAA+NON-PRB-NCNCRNG: NOT DETECTED
M PNEUMO IGG SER IA-ACNC: NOT DETECTED
MACROPHAGE FLUID: 12 %
MECA+MECC ISLT/SPM QL: NORMAL
NDM GENE: NORMAL
NEUTROPHILS NFR FLD MANUAL: 50 %
NIGHT BLUE STAIN TISS: NORMAL
P AERUGINOSA DNA BAL NAA+NON-PRB-NCNCRNG: NOT DETECTED
PROTEUS SP DNA BAL NAA+NON-PRB-NCNCRNG: NOT DETECTED
RHINOVIRUS RNA SPEC NAA+PROBE: NOT DETECTED
RSV RNA NPH QL NAA+NON-PROBE: NOT DETECTED
S AUREUS DNA BAL NAA+NON-PRB-NCNCRNG: NOT DETECTED
S MARCESCENS DNA BAL NAA+NON-PRB-NCNCRNG: NOT DETECTED
S PNEUM DNA BAL NAA+NON-PRB-NCNCRNG: NOT DETECTED
S PYO DNA BAL NAA+NON-PRB-NCNCRNG: NOT DETECTED
VISUAL PRESENCE OF BLOOD: NORMAL

## 2023-10-16 PROCEDURE — 25010000002 FENTANYL CITRATE (PF) 50 MCG/ML SOLUTION: Performed by: NURSE ANESTHETIST, CERTIFIED REGISTERED

## 2023-10-16 PROCEDURE — 25010000002 ONDANSETRON PER 1 MG: Performed by: NURSE ANESTHETIST, CERTIFIED REGISTERED

## 2023-10-16 PROCEDURE — 25810000003 LACTATED RINGERS PER 1000 ML: Performed by: ANESTHESIOLOGY

## 2023-10-16 PROCEDURE — 88108 CYTOPATH CONCENTRATE TECH: CPT | Performed by: INTERNAL MEDICINE

## 2023-10-16 PROCEDURE — 89051 BODY FLUID CELL COUNT: CPT | Performed by: INTERNAL MEDICINE

## 2023-10-16 PROCEDURE — 87102 FUNGUS ISOLATION CULTURE: CPT | Performed by: INTERNAL MEDICINE

## 2023-10-16 PROCEDURE — 87206 SMEAR FLUORESCENT/ACID STAI: CPT | Performed by: INTERNAL MEDICINE

## 2023-10-16 PROCEDURE — 87205 SMEAR GRAM STAIN: CPT | Performed by: INTERNAL MEDICINE

## 2023-10-16 PROCEDURE — 88104 CYTOPATH FL NONGYN SMEARS: CPT | Performed by: INTERNAL MEDICINE

## 2023-10-16 PROCEDURE — 76000 FLUOROSCOPY <1 HR PHYS/QHP: CPT

## 2023-10-16 PROCEDURE — 88312 SPECIAL STAINS GROUP 1: CPT | Performed by: INTERNAL MEDICINE

## 2023-10-16 PROCEDURE — 87633 RESP VIRUS 12-25 TARGETS: CPT | Performed by: INTERNAL MEDICINE

## 2023-10-16 PROCEDURE — 87116 MYCOBACTERIA CULTURE: CPT | Performed by: INTERNAL MEDICINE

## 2023-10-16 PROCEDURE — 88173 CYTOPATH EVAL FNA REPORT: CPT | Performed by: INTERNAL MEDICINE

## 2023-10-16 PROCEDURE — 87071 CULTURE AEROBIC QUANT OTHER: CPT | Performed by: INTERNAL MEDICINE

## 2023-10-16 PROCEDURE — 25010000002 SUGAMMADEX 200 MG/2ML SOLUTION: Performed by: NURSE ANESTHETIST, CERTIFIED REGISTERED

## 2023-10-16 PROCEDURE — 88305 TISSUE EXAM BY PATHOLOGIST: CPT | Performed by: INTERNAL MEDICINE

## 2023-10-16 PROCEDURE — 25010000002 PROPOFOL 10 MG/ML EMULSION: Performed by: NURSE ANESTHETIST, CERTIFIED REGISTERED

## 2023-10-16 PROCEDURE — 25010000002 DEXAMETHASONE PER 1 MG: Performed by: NURSE ANESTHETIST, CERTIFIED REGISTERED

## 2023-10-16 PROCEDURE — 25010000002 MIDAZOLAM PER 1MG: Performed by: ANESTHESIOLOGY

## 2023-10-16 PROCEDURE — 93005 ELECTROCARDIOGRAM TRACING: CPT | Performed by: ANESTHESIOLOGY

## 2023-10-16 PROCEDURE — C1726 CATH, BAL DIL, NON-VASCULAR: HCPCS | Performed by: INTERNAL MEDICINE

## 2023-10-16 RX ORDER — SODIUM CHLORIDE, SODIUM LACTATE, POTASSIUM CHLORIDE, CALCIUM CHLORIDE 600; 310; 30; 20 MG/100ML; MG/100ML; MG/100ML; MG/100ML
9 INJECTION, SOLUTION INTRAVENOUS CONTINUOUS PRN
Status: DISCONTINUED | OUTPATIENT
Start: 2023-10-16 | End: 2023-10-16 | Stop reason: HOSPADM

## 2023-10-16 RX ORDER — ONDANSETRON 2 MG/ML
4 INJECTION INTRAMUSCULAR; INTRAVENOUS ONCE AS NEEDED
Status: DISCONTINUED | OUTPATIENT
Start: 2023-10-16 | End: 2023-10-16 | Stop reason: HOSPADM

## 2023-10-16 RX ORDER — DEXAMETHASONE SODIUM PHOSPHATE 4 MG/ML
INJECTION, SOLUTION INTRA-ARTICULAR; INTRALESIONAL; INTRAMUSCULAR; INTRAVENOUS; SOFT TISSUE AS NEEDED
Status: DISCONTINUED | OUTPATIENT
Start: 2023-10-16 | End: 2023-10-16 | Stop reason: SURG

## 2023-10-16 RX ORDER — ONDANSETRON 2 MG/ML
INJECTION INTRAMUSCULAR; INTRAVENOUS AS NEEDED
Status: DISCONTINUED | OUTPATIENT
Start: 2023-10-16 | End: 2023-10-16 | Stop reason: SURG

## 2023-10-16 RX ORDER — LIDOCAINE HYDROCHLORIDE 20 MG/ML
INJECTION, SOLUTION EPIDURAL; INFILTRATION; INTRACAUDAL; PERINEURAL AS NEEDED
Status: DISCONTINUED | OUTPATIENT
Start: 2023-10-16 | End: 2023-10-16 | Stop reason: SURG

## 2023-10-16 RX ORDER — DEXMEDETOMIDINE HYDROCHLORIDE 100 UG/ML
INJECTION, SOLUTION INTRAVENOUS AS NEEDED
Status: DISCONTINUED | OUTPATIENT
Start: 2023-10-16 | End: 2023-10-16 | Stop reason: SURG

## 2023-10-16 RX ORDER — PROMETHAZINE HYDROCHLORIDE 12.5 MG/1
25 TABLET ORAL ONCE AS NEEDED
Status: DISCONTINUED | OUTPATIENT
Start: 2023-10-16 | End: 2023-10-16 | Stop reason: HOSPADM

## 2023-10-16 RX ORDER — PROMETHAZINE HYDROCHLORIDE 25 MG/1
25 SUPPOSITORY RECTAL ONCE AS NEEDED
Status: DISCONTINUED | OUTPATIENT
Start: 2023-10-16 | End: 2023-10-16 | Stop reason: HOSPADM

## 2023-10-16 RX ORDER — MIDAZOLAM HYDROCHLORIDE 2 MG/2ML
2 INJECTION, SOLUTION INTRAMUSCULAR; INTRAVENOUS ONCE
Status: COMPLETED | OUTPATIENT
Start: 2023-10-16 | End: 2023-10-16

## 2023-10-16 RX ORDER — OXYCODONE HYDROCHLORIDE 5 MG/1
5 TABLET ORAL
Status: DISCONTINUED | OUTPATIENT
Start: 2023-10-16 | End: 2023-10-16 | Stop reason: HOSPADM

## 2023-10-16 RX ORDER — FENTANYL CITRATE 50 UG/ML
INJECTION, SOLUTION INTRAMUSCULAR; INTRAVENOUS AS NEEDED
Status: DISCONTINUED | OUTPATIENT
Start: 2023-10-16 | End: 2023-10-16 | Stop reason: SURG

## 2023-10-16 RX ORDER — PROPOFOL 10 MG/ML
VIAL (ML) INTRAVENOUS AS NEEDED
Status: DISCONTINUED | OUTPATIENT
Start: 2023-10-16 | End: 2023-10-16 | Stop reason: SURG

## 2023-10-16 RX ORDER — ACETAMINOPHEN 500 MG
1000 TABLET ORAL ONCE
Status: COMPLETED | OUTPATIENT
Start: 2023-10-16 | End: 2023-10-16

## 2023-10-16 RX ORDER — ROCURONIUM BROMIDE 10 MG/ML
INJECTION, SOLUTION INTRAVENOUS AS NEEDED
Status: DISCONTINUED | OUTPATIENT
Start: 2023-10-16 | End: 2023-10-16 | Stop reason: SURG

## 2023-10-16 RX ORDER — MEPERIDINE HYDROCHLORIDE 25 MG/ML
12.5 INJECTION INTRAMUSCULAR; INTRAVENOUS; SUBCUTANEOUS
Status: DISCONTINUED | OUTPATIENT
Start: 2023-10-16 | End: 2023-10-16 | Stop reason: HOSPADM

## 2023-10-16 RX ADMIN — DEXAMETHASONE SODIUM PHOSPHATE 8 MG: 4 INJECTION, SOLUTION INTRAMUSCULAR; INTRAVENOUS at 08:42

## 2023-10-16 RX ADMIN — ONDANSETRON 4 MG: 2 INJECTION INTRAMUSCULAR; INTRAVENOUS at 08:42

## 2023-10-16 RX ADMIN — ACETAMINOPHEN 1000 MG: 500 TABLET ORAL at 06:49

## 2023-10-16 RX ADMIN — SUGAMMADEX 200 MG: 100 INJECTION, SOLUTION INTRAVENOUS at 09:18

## 2023-10-16 RX ADMIN — FENTANYL CITRATE 50 MCG: 50 INJECTION, SOLUTION INTRAMUSCULAR; INTRAVENOUS at 08:20

## 2023-10-16 RX ADMIN — PROPOFOL 150 MCG/KG/MIN: 10 INJECTION, EMULSION INTRAVENOUS at 08:23

## 2023-10-16 RX ADMIN — FENTANYL CITRATE 50 MCG: 50 INJECTION, SOLUTION INTRAMUSCULAR; INTRAVENOUS at 08:36

## 2023-10-16 RX ADMIN — MIDAZOLAM HYDROCHLORIDE 2 MG: 1 INJECTION, SOLUTION INTRAMUSCULAR; INTRAVENOUS at 07:44

## 2023-10-16 RX ADMIN — DEXMEDETOMIDINE 20 MCG: 100 INJECTION, SOLUTION INTRAVENOUS at 08:20

## 2023-10-16 RX ADMIN — LIDOCAINE HYDROCHLORIDE 50 MG: 20 INJECTION, SOLUTION EPIDURAL; INFILTRATION; INTRACAUDAL; PERINEURAL at 08:20

## 2023-10-16 RX ADMIN — PROPOFOL 150 MG: 10 INJECTION, EMULSION INTRAVENOUS at 08:20

## 2023-10-16 RX ADMIN — SODIUM CHLORIDE, POTASSIUM CHLORIDE, SODIUM LACTATE AND CALCIUM CHLORIDE 9 ML/HR: 600; 310; 30; 20 INJECTION, SOLUTION INTRAVENOUS at 06:49

## 2023-10-16 RX ADMIN — ROCURONIUM BROMIDE 50 MG: 50 INJECTION INTRAVENOUS at 08:20

## 2023-10-16 NOTE — ANESTHESIA PREPROCEDURE EVALUATION
Anesthesia Evaluation     Patient summary reviewed and Nursing notes reviewed   no history of anesthetic complications:   NPO Solid Status: > 8 hours  NPO Liquid Status: > 2 hours           Airway   Mallampati: II  TM distance: >3 FB  Neck ROM: full  No difficulty expected  Dental    (+) upper dentures and poor dentition        Pulmonary - normal exam    breath sounds clear to auscultation  (+) pneumonia , COPD,sleep apnea    ROS comment: Lung nodule  Cardiovascular - negative cardio ROS and normal exam  Exercise tolerance: good (4-7 METS)    Rhythm: regular  Rate: normal        Neuro/Psych- negative ROS  GI/Hepatic/Renal/Endo    (+) obesity, hiatal hernia (large, surgery planned)    Musculoskeletal     Abdominal    Substance History      OB/GYN          Other        ROS/Med Hx Other: PAT Nursing Notes unavailable.               Anesthesia Plan    ASA 3     general     (Patient understands anesthesia not responsible for dental damage.)  intravenous induction     Anesthetic plan, risks, benefits, and alternatives have been provided, discussed and informed consent has been obtained with: patient.    Plan discussed with CRNA.    CODE STATUS:

## 2023-10-16 NOTE — DISCHARGE INSTRUCTIONS
DISCHARGE INSTRUCTIONS  BRONCHOSCOPY      For your procedure you had:  General Anesthesia (you may have a sore throat for the first 24 hours)    You may experience dizziness, drowsiness, or lightheadedness for several hours following surgery/procedure.  Do not stay alone today or tonight.  Limit your activity for 24 hours.  You should not drive or operate machinery or drink alcohol for 24 hours or while you are taking pain medication.  You should not sign legally binding documents.  Resume your diet slowly.  Follow any special dietary instructions you may have been given by your doctor.  NOTIFY YOUR DOCTOR IF YOU EXPERIENCE ANY OF THE FOLLOWING:  Temperature greater than 101 degrees Fahrenheit  Shaking Chills  Redness or excessive drainage from incision  Nausea, vomiting and/or pain that is not controlled by prescribed medications  Increase in bleeding or bleeding that is excessive  Unable to urinate in 6 hours after surgery  If unable to reach your doctor, please go to the closest Emergency Room     Following your bronchoscopy, you may experience a mild sore throat or cough up small amounts of blood.  Extremes of either should be reported to your doctor.  If you have sudden shortness of breath, chest pain, chest pressure that worsens over time or sharp chest pains that worsen with deep breaths or coughs go the ED or call 911.  Smokers are encouraged not to smoke for 6 to 8 hours after the procedure to decrease the risk of coughing and bleeding.  Nausea and vomiting can occur, but is not a common side effect of the procedure you have had today. If you experience any nausea or vomiting, take clear liquids for your next meal.  Coughing (slight dry cough to hacking cough) is completely expected for 3 to 4 days.  Pink/ blood tinged sputum is expected for several days ( or while coughing)  Notify MD or go to the ED if significant blood is found in the sputum.  Missing your appointment/follow-up could lead to serious  complications.  Tylenol 1000mg given at 0649 wait 4 hours to take any additional tylenol.

## 2023-10-16 NOTE — ANESTHESIA POSTPROCEDURE EVALUATION
Patient: Anna Solorio    Procedure Summary       Date: 10/16/23 Room / Location: [facility] OR 04 / [facility] MAIN OR    Anesthesia Start: 0815 Anesthesia Stop: 0929    Procedure: BRONCHOSCOPY WITH ION ROBOT, REBUS, BAL, BRUSHINGS, NEEDLE ASPIRATE, BIOPSIES (Bronchus) Diagnosis:       Multiple lung nodules      (Multiple lung nodules [R91.8])    Surgeons: Lewis Mix MD Provider: Tiago Pagan MD    Anesthesia Type: general ASA Status: 3            Anesthesia Type: general    Vitals  Vitals Value Taken Time   /66 10/16/23 1011   Temp 36.2 °C (97.2 °F) 10/16/23 0955   Pulse 96 10/16/23 1013   Resp 18 10/16/23 1005   SpO2 91 % 10/16/23 1013   Vitals shown include unfiled device data.        Post Anesthesia Care and Evaluation    Patient location during evaluation: bedside  Patient participation: complete - patient participated  Level of consciousness: awake  Pain management: adequate    Airway patency: patent  PONV Status: none  Cardiovascular status: acceptable and stable  Respiratory status: acceptable  Hydration status: acceptable    Comments: An Anesthesiologist personally participated in the most demanding procedures (including induction and emergence if applicable) in the anesthesia plan, monitored the course of anesthesia administration at frequent intervals and remained physically present and available for immediate diagnosis and treatment of emergencies.

## 2023-10-16 NOTE — OP NOTE
Robotic navigational assisted bronchoscopy with radial probe endobronchial ultrasound, transbronchial needle aspiration, transbronchial lung biopsies, brushings, bronchoalveolar lavage  Bronchoscopy with clearance of airways    Pre-Operative Diagnosis: Multiple lung nodules     Post-Operative Diagnosis: Same.  Mucous plugging     Timeout performed     Anesthesia: General anesthesia     Procedure Details: The patient was consented for the procedure with all risk and benefit of the procedure explained in detail.  She was given the opportunity to ask questions and all concerns were answered. The bronchoscope was inserted into the main airway via the endotracheal tube. An anatomical survey was done of the main airways and the subsegmental bronchus.      Findings: First, using fiberoptic bronchoscope airway inspection was performed.  Endotracheal tube was in adequate position in the mid thoracic trachea.  The trachea was normal in caliber and had no mucosal abnormalities. The left tracheobronchial tree appeared anatomically normal with no mucosal abnormalities. The right tracheobronchial tree appeared anatomically normal with no mucosal abnormalities.  Copious amounts of thick viscous cloudy secretions were seen obstructing both left and right lower lobe bronchi.  These were cleared and removed after instillation of saline aliquots.  All airways were evaluated and cleared.    Next the fiberoptic bronchoscope was removed and Ion robotic navigational bronchoscope was inserted into the endotracheal tube.  Using navigational guidance, we approach to the lung nodule in the left lower lobe of lung.  Identification of left lower lobe lung nodule and appropriate positioning was confirmed via radial probe EBUS and fluoroscopy.  Under fluoroscopic guidance, transbronchial needle aspiration was performed at the lung nodule in the left lower lobe of the lung.  Radial probe EBUS was then inserted to again confirm appropriate position  under fluoroscopic guidance.  Transbronchial lung biopsies were performed under fluoroscopic guidance at the lung nodule in the left lower lobe of the lung. Radial probe EBUS was then inserted to again confirm appropriate position under fluoroscopic guidance.  Brushings  were performed under fluoroscopic guidance at the lung nodule of the left lower lobe bronchus. A bronchoalveolar lavage was performed using 30 mL aliquots of normal saline  instilled into the left lower lobe bronchus then aspirated back. There was 20 mL turbid and cloudy fluid in return.      Findings:  Mucous plugging left and right lower lobe bronchi with thick viscous cloudy secretions     Estimated Blood Loss: Less than 10 mL     Specimens:  Transbronchial needle aspiration lung nodule left lower lobe of lung  Transbronchial lung biopsies lung nodule left lower lobe of lung  Brushings lung nodule left lower lobe of lung  Bronchoalveolar lavage left lower lobe bronchus     Complications: None; patient tolerated the procedure well.     Disposition: Stable to discharge home.  Follow-up test results.     Patient tolerated the procedure well.    Electronically signed by Lewis Mix MD, 10/16/23, 9:34 AM EDT.

## 2023-10-16 NOTE — INTERVAL H&P NOTE
H&P reviewed. The patient was examined and there are no changes to the H&P.      Electronically signed by Lewis Mix MD, 10/16/23, 7:25 AM EDT.

## 2023-10-17 NOTE — TELEPHONE ENCOUNTER
Authorization obtained by CHRISTINA Jackson for CT Chest without contrast / rudolph bronch protocol performed on 10/13/2023 for Robotic bronchoscopy performed on 10/16/2023.  Auth effective until 11/10/2023; Auth # [id number].

## 2023-10-18 LAB
BACTERIA SPEC AEROBE CULT: NO GROWTH
CYTO UR: NORMAL
CYTO UR: NORMAL
GRAM STN SPEC: NORMAL
GRAM STN SPEC: NORMAL
LAB AP CASE REPORT: NORMAL
LAB AP CASE REPORT: NORMAL
LAB AP CLINICAL INFORMATION: NORMAL
LAB AP CLINICAL INFORMATION: NORMAL
LAB AP SPECIAL STAINS: NORMAL
PATH REPORT.FINAL DX SPEC: NORMAL
PATH REPORT.FINAL DX SPEC: NORMAL
PATH REPORT.GROSS SPEC: NORMAL
PATH REPORT.GROSS SPEC: NORMAL

## 2023-10-19 LAB
QT INTERVAL: 410 MS
QTC INTERVAL: 475 MS

## 2023-11-13 LAB — FUNGUS WND CULT: NORMAL

## 2023-11-27 LAB
MYCOBACTERIUM SPEC CULT: NORMAL
NIGHT BLUE STAIN TISS: NORMAL
NIGHT BLUE STAIN TISS: NORMAL

## 2023-11-29 ENCOUNTER — TRANSCRIBE ORDERS (OUTPATIENT)
Dept: ADMINISTRATIVE | Facility: HOSPITAL | Age: 58
End: 2023-11-29
Payer: COMMERCIAL

## 2023-11-29 DIAGNOSIS — I10 HYPERTENSION, UNSPECIFIED TYPE: ICD-10-CM

## 2023-11-29 DIAGNOSIS — R06.02 SHORTNESS OF BREATH: Primary | ICD-10-CM

## 2023-12-13 ENCOUNTER — OFFICE VISIT (OUTPATIENT)
Dept: CARDIOLOGY | Facility: CLINIC | Age: 58
End: 2023-12-13
Payer: COMMERCIAL

## 2023-12-13 VITALS
SYSTOLIC BLOOD PRESSURE: 150 MMHG | BODY MASS INDEX: 34.74 KG/M2 | WEIGHT: 184 LBS | DIASTOLIC BLOOD PRESSURE: 90 MMHG | HEIGHT: 61 IN | HEART RATE: 97 BPM

## 2023-12-13 DIAGNOSIS — Z01.810 PRE-OPERATIVE CARDIOVASCULAR EXAMINATION: Primary | ICD-10-CM

## 2023-12-13 DIAGNOSIS — I10 ESSENTIAL HYPERTENSION: ICD-10-CM

## 2023-12-13 DIAGNOSIS — E78.2 MIXED DYSLIPIDEMIA: ICD-10-CM

## 2023-12-13 PROCEDURE — 99204 OFFICE O/P NEW MOD 45 MIN: CPT | Performed by: INTERNAL MEDICINE

## 2023-12-13 RX ORDER — POTASSIUM CHLORIDE 1500 MG/1
1 TABLET, EXTENDED RELEASE ORAL DAILY
COMMUNITY
Start: 2023-11-28

## 2023-12-13 NOTE — PROGRESS NOTES
Chief Complaint  Rapid Heart Rate and Heart Murmur      History of Present Illness  Anna Solorio presents to [facility]    This is a very pleasant 58-year-old lady with hypertension, dyslipidemia, obesity, COPD, presents to clinic for preoperative cardiovascular examination.  She is planned to undergo hiatal hernia surgery this coming Monday.  She has no cardiac complaints.  She has no chest discomfort or palpitations.  She has no dizziness, presyncope or syncope.  She has chronic shortness of breath related to COPD which has been stable.  She is physically active at work.  She is able to walk more than 1 mile without extraordinary limitations.      Past Medical History:   Diagnosis Date    Allergic rhinitis     Anemia     on iron    Anxiety     Depression     Diverticulitis     GERD (gastroesophageal reflux disease)     Lung mass     Mood disorder     Pneumonia     chronic    Sleep apnea          Current Outpatient Medications:     albuterol (PROVENTIL) (2.5 MG/3ML) 0.083% nebulizer solution, Take 2.5 mg by nebulization Every 4 (Four) Hours As Needed for Wheezing., Disp: , Rfl:     albuterol sulfate  (90 Base) MCG/ACT inhaler, Inhale 2 puffs Every 4 (Four) Hours As Needed., Disp: , Rfl:     atorvastatin (LIPITOR) 10 MG tablet, Take 1 tablet by mouth Daily., Disp: , Rfl:     busPIRone (BUSPAR) 10 MG tablet, Take 1 tablet by mouth 3 (Three) Times a Day., Disp: , Rfl:     DULoxetine (CYMBALTA) 60 MG capsule, Take 2 capsules by mouth Daily., Disp: , Rfl:     ferrous sulfate 325 (65 FE) MG tablet, Take 1 tablet by mouth Daily With Breakfast., Disp: , Rfl:     fluticasone (FLONASE) 50 MCG/ACT nasal spray, 2 sprays by Each Nare route Daily., Disp: 1 g, Rfl: 5    levocetirizine (XYZAL) 5 MG tablet, Take 1 tablet by mouth Daily., Disp: , Rfl:     levothyroxine (SYNTHROID, LEVOTHROID) 112 MCG tablet, Take 1 tablet by mouth Daily., Disp: , Rfl:     metoprolol succinate XL (TOPROL-XL) 25  "MG 24 hr tablet, Take 1 tablet by mouth Daily., Disp: , Rfl:     montelukast (SINGULAIR) 10 MG tablet, Take 1 tablet by mouth Daily., Disp: , Rfl:     omeprazole (priLOSEC) 40 MG capsule, Take 1 capsule by mouth 2 (Two) Times a Day., Disp: , Rfl:     potassium chloride ER (K-TAB) 20 MEQ tablet controlled-release ER tablet, Take 1 tablet by mouth Daily., Disp: , Rfl:     Umeclidinium-Vilanterol (ANORO ELLIPTA IN), Inhale 1 puff 2 (Two) Times a Day., Disp: , Rfl:     There are no discontinued medications.  No Known Allergies     Social History     Tobacco Use    Smoking status: Former     Packs/day: 1.50     Years: 36.00     Additional pack years: 0.00     Total pack years: 54.00     Types: Cigarettes     Start date:      Quit date: 2015     Years since quittin.5     Passive exposure: Past    Smokeless tobacco: Never   Vaping Use    Vaping Use: Never used   Substance Use Topics    Alcohol use: Not Currently     Comment: social    Drug use: Never       Family History   Problem Relation Age of Onset    Leukemia Mother     Leukemia Father         Objective     /90   Pulse 97   Ht 154.9 cm (60.98\")   Wt 83.5 kg (184 lb)   BMI 34.78 kg/m²       Physical Exam  Constitutional:       General: Awake. Not in acute distress.     Appearance: Normal appearance.   Neck:      Vascular: No carotid bruit, hepatojugular reflux or JVD.   Cardiovascular:      Rate and Rhythm: Normal rate and regular rhythm.      Chest Wall: PMI is not displaced.      Heart sounds: Normal heart sounds, S1 normal and S2 normal. No murmur heard.   No friction rub. No gallop. No S3 or S4 sounds.    Pulmonary:      Effort: Pulmonary effort is normal.      Breath sounds: Normal breath sounds. No wheezing, rhonchi or rales.   Ext.      No edema.   Skin:     General: Skin is warm and dry.      Coloration: Skin is not cyanotic.      Findings: No petechiae or rash.   Neurological:      Mental Status: Alert and oriented x 3  Psychiatric:       " "  Behavior: Behavior is cooperative.       Result Review :     proBNP   Date Value Ref Range Status   05/10/2023 142.1 0.0 - 900.0 pg/mL Final     CMP          5/10/2023    18:31 5/11/2023    05:26 5/12/2023    05:18   CMP   Glucose 173  235  156    BUN 7  5  12    Creatinine 0.67  0.59  0.64    EGFR 101.5  104.6  102.6    Sodium 140  140  139    Potassium 2.6  3.5  3.8    Chloride 97  97  102    Calcium 8.5  8.5  8.7    Total Protein 7.4  7.6     Albumin 4.1  3.9  3.6    Globulin 3.3  3.7     Total Bilirubin 0.4  0.3     Alkaline Phosphatase 108  100     AST (SGOT) 18  17     ALT (SGPT) 20  19     Albumin/Globulin Ratio 1.2  1.1     BUN/Creatinine Ratio 10.4  8.5  18.8    Anion Gap 10.2  11.8  7.0      CBC w/diff          5/11/2023    05:26 5/12/2023    05:18 9/7/2023    15:31   CBC w/Diff   WBC 15.63  13.52  9.87    RBC 4.11  4.02  4.71    Hemoglobin 11.4  11.1  12.7    Hematocrit 34.3  35.1  38.7    MCV 83.5  87.3  82.2    MCH 27.7  27.6  27.0    MCHC 33.2  31.6  32.8    RDW 14.0  14.4  13.4    Platelets 435  479  479    Neutrophil Rel % 96.2   59.3    Immature Granulocyte Rel % 0.4   0.5    Lymphocyte Rel % 2.8   32.7    Monocyte Rel % 0.3   5.3    Eosinophil Rel % 0.0   1.3    Basophil Rel % 0.3   0.9       Lab Results   Component Value Date    TSH 2.520 01/09/2020      Lab Results   Component Value Date    FREET4 1.7 01/09/2020      No results found for: \"DDIMERQUANT\"  Magnesium   Date Value Ref Range Status   05/12/2023 2.3 1.6 - 2.6 mg/dL Final      No results found for: \"DIGOXIN\"   Lab Results   Component Value Date    TROPONINT 7 05/10/2023      No results found for: \"POCTROP\"(                   No results found for this or any previous visit.                Diagnoses and all orders for this visit:    1. Pre-operative cardiovascular examination (Primary)    2. Essential hypertension    3. Mixed dyslipidemia      Assessment:    Preoperative cardiovascular examination: She is planned to undergo hiatal hernia " surgery this coming Monday.  Her exam is benign.  Recent ECG was reviewed and was unremarkable.  Her functional capacity is adequate and estimated to be more than 4 METS.  She is at low risk for perioperative cardiac complications from an intermediate risk surgery which may proceed as planned.    Essential hypertension: Her blood pressure is elevated today which is unusual for her.  She took decongestant this morning.  She will be continued on current medical therapy for blood pressure.  Blood pressure monitoring was advised.    Mixed dyslipidemia: On atorvastatin.  Continue the same.    Follow Up       Return in about 1 year (around 12/13/2024) for With Kelsey VASQUEZ.    Patient was given instructions and counseling regarding her condition or for health maintenance advice. Please see specific information pulled into the AVS if appropriate.

## 2024-09-04 ENCOUNTER — TRANSCRIBE ORDERS (OUTPATIENT)
Dept: ADMINISTRATIVE | Facility: HOSPITAL | Age: 59
End: 2024-09-04
Payer: COMMERCIAL

## 2024-09-04 DIAGNOSIS — Z87.891 PERSONAL HISTORY OF NICOTINE DEPENDENCE: Primary | ICD-10-CM

## 2024-09-04 DIAGNOSIS — Z12.31 SCREENING MAMMOGRAM FOR BREAST CANCER: Primary | ICD-10-CM

## 2024-09-20 ENCOUNTER — HOSPITAL ENCOUNTER (OUTPATIENT)
Dept: CT IMAGING | Facility: HOSPITAL | Age: 59
Discharge: HOME | End: 2024-09-20
Payer: COMMERCIAL

## 2024-09-20 ENCOUNTER — HOSPITAL ENCOUNTER (OUTPATIENT)
Dept: MAMMOGRAPHY | Facility: HOSPITAL | Age: 59
Discharge: HOME | End: 2024-09-20
Payer: COMMERCIAL

## 2024-09-20 DIAGNOSIS — Z12.31 SCREENING MAMMOGRAM FOR BREAST CANCER: ICD-10-CM

## 2024-09-20 DIAGNOSIS — Z87.891 PERSONAL HISTORY OF NICOTINE DEPENDENCE: ICD-10-CM

## 2024-09-20 PROCEDURE — 71271 CT THORAX LUNG CANCER SCR C-: CPT

## 2024-09-20 PROCEDURE — 77067 SCR MAMMO BI INCL CAD: CPT

## 2024-09-20 PROCEDURE — 77063 BREAST TOMOSYNTHESIS BI: CPT

## 2025-08-19 ENCOUNTER — TELEPHONE (OUTPATIENT)
Dept: ADMINISTRATIVE | Facility: OTHER | Age: 60
End: 2025-08-19
Payer: COMMERCIAL

## (undated) DEVICE — VISION PROBE: Brand: ION

## (undated) DEVICE — Device: Brand: ION

## (undated) DEVICE — SOLIDIFIER LIQLOC PLS 1500CC BT

## (undated) DEVICE — CATHETER GUIDE

## (undated) DEVICE — Device

## (undated) DEVICE — STPCK 1WY SPINLOCK FML LL NONDEHP LF .20ML

## (undated) DEVICE — DISPOSABLE CYTOLOGY BRUSH: Brand: DISPOSABLE CYTOLOGY BRUSH

## (undated) DEVICE — LINER SURG CANSTR SXN S/RIGD 1500CC

## (undated) DEVICE — SINGLE USE BIOPSY VALVE MAJ-210: Brand: SINGLE USE BIOPSY VALVE (STERILE)

## (undated) DEVICE — Device: Brand: BALLOON

## (undated) DEVICE — SINGLE USE SUCTION VALVE MAJ-209: Brand: SINGLE USE SUCTION VALVE (STERILE)

## (undated) DEVICE — SENSOR CONNECTION CLEANER

## (undated) DEVICE — BIOPSY NEEDLE, 21G: Brand: FLEXISION

## (undated) DEVICE — VISION PROBE ADAPTER AND SUCTION ADAPTER

## (undated) DEVICE — CATHETER: Brand: ION

## (undated) DEVICE — REPROCESSING ACCESSORIES KIT

## (undated) DEVICE — SINGLE USE ASPIRATION NEEDLE: Brand: SINGLE USE ASPIRATION NEEDLE

## (undated) DEVICE — REPROCESSING CONSUMABLES KIT

## (undated) DEVICE — TRAP,MUCUS SPECIMEN,40CC: Brand: MEDLINE

## (undated) DEVICE — REPROCESSING COVER

## (undated) DEVICE — FRCP BIOP CAPTURA BRONCH 1.8 110CM BLU

## (undated) DEVICE — SYRINGE ONLY,20ML LUER LOCK: Brand: MEDLINE INDUSTRIES, INC.

## (undated) DEVICE — SWIVEL CONNECTOR